# Patient Record
Sex: MALE | Race: WHITE | NOT HISPANIC OR LATINO
[De-identification: names, ages, dates, MRNs, and addresses within clinical notes are randomized per-mention and may not be internally consistent; named-entity substitution may affect disease eponyms.]

---

## 2021-10-12 PROBLEM — Z00.00 ENCOUNTER FOR PREVENTIVE HEALTH EXAMINATION: Status: ACTIVE | Noted: 2021-10-12

## 2021-10-21 ENCOUNTER — APPOINTMENT (OUTPATIENT)
Dept: INFECTIOUS DISEASE | Facility: CLINIC | Age: 61
End: 2021-10-21
Payer: COMMERCIAL

## 2021-10-21 ENCOUNTER — LABORATORY RESULT (OUTPATIENT)
Age: 61
End: 2021-10-21

## 2021-10-21 ENCOUNTER — NON-APPOINTMENT (OUTPATIENT)
Age: 61
End: 2021-10-21

## 2021-10-21 VITALS
OXYGEN SATURATION: 98 % | SYSTOLIC BLOOD PRESSURE: 124 MMHG | TEMPERATURE: 98.5 F | DIASTOLIC BLOOD PRESSURE: 82 MMHG | BODY MASS INDEX: 25.58 KG/M2 | HEART RATE: 86 BPM | WEIGHT: 163 LBS | HEIGHT: 67 IN | RESPIRATION RATE: 15 BRPM

## 2021-10-21 DIAGNOSIS — E11.37X2 TYPE 2 DIABETES MELLITUS WITH DIABETIC MACULAR EDEMA, RESOLVED FOLLOWING TREATMENT, LEFT EYE: ICD-10-CM

## 2021-10-21 DIAGNOSIS — N20.0 CALCULUS OF KIDNEY: ICD-10-CM

## 2021-10-21 PROCEDURE — 99204 OFFICE O/P NEW MOD 45 MIN: CPT

## 2021-10-21 NOTE — REASON FOR VISIT
[Consultation] : a consultation visit [FreeTextEntry1] : New pt referred by PCP Dr. Hyman for "Recurrent Shingles"\par currently c/o pain, itchiness, burning, and sensitivity on right side (abdomen/back)\par completed 7 day course of Valtrex 2 weeks ago, per pt has not resolved his issue; would like to discuss Shingles vaccine and other vaccines

## 2021-10-21 NOTE — HISTORY OF PRESENT ILLNESS
[FreeTextEntry1] : 61 Male with DM, HTN, and Nephrolithiasis\par S/P Fall at home around pool in ? January 2021 with mild back pain/ injury did not seek attention\par Pt developed acute painful tingling o left thorax and back in February and was Dx with Zoster sine lesions\par no skin lesions.  Pt had another bout of lesion less painful skin on left side in August\par Pt had High VZV IgG level and low VZV IgM  and was Dx with recurrent Zoster and prescribed Gabapentin\par Pt has no Immune disorder other than DM\par Hx Uric acid kidney stones S/P ,Lithotripsy\par \par Pt had Chicken pox as a child\par \par Pt is interested in receiving covid booster, Flu vaccine, and Shingrix\par \par Pt has had back exam including MRI studies which were reportedly normal

## 2021-10-21 NOTE — PHYSICAL EXAM
[General Appearance - Alert] : alert [General Appearance - In No Acute Distress] : in no acute distress [General Appearance - Well Nourished] : well nourished [General Appearance - Well-Appearing] : healthy appearing [Sclera] : the sclera and conjunctiva were normal [PERRL With Normal Accommodation] : pupils were equal in size, round, reactive to light [Extraocular Movements] : extraocular movements were intact [Outer Ear] : the ears and nose were normal in appearance [Hearing Threshold Finger Rub Not [place]] : hearing was normal [Examination Of The Oral Cavity] : the lips and gums were normal [Oropharynx] : the oropharynx was normal with no thrush [Neck Appearance] : the appearance of the neck was normal [Neck Cervical Mass (___cm)] : no neck mass was observed [Jugular Venous Distention Increased] : there was no jugular-venous distention [Thyroid Diffuse Enlargement] : the thyroid was not enlarged [Respiration, Rhythm And Depth] : normal respiratory rhythm and effort [Exaggerated Use Of Accessory Muscles For Inspiration] : no accessory muscle use [Auscultation Breath Sounds / Voice Sounds] : lungs were clear to auscultation bilaterally [Heart Rate And Rhythm] : heart rate was normal and rhythm regular [Heart Sounds] : normal S1 and S2 [Heart Sounds Gallop] : no gallops [Murmurs] : no murmurs [Heart Sounds Pericardial Friction Rub] : no pericardial rub [Full Pulse] : the pedal pulses are present [Edema] : there was no peripheral edema [Bowel Sounds] : normal bowel sounds [Abdomen Soft] : soft [Abdomen Tenderness] : non-tender [Costovertebral Angle Tenderness] : no CVA tenderness [Abdomen Mass (___ Cm)] : no abdominal mass palpated [No Palpable Adenopathy] : no palpable adenopathy [Musculoskeletal - Swelling] : no joint swelling [Range of Motion to Joints] : range of motion to joints [Nail Clubbing] : no clubbing  or cyanosis of the fingernails [Motor Tone] : muscle strength and tone were normal [Skin Color & Pigmentation] : normal skin color and pigmentation [] : no rash [Skin Lesions] : no skin lesions [Cranial Nerves] : cranial nerves 2-12 were intact [Sensation] : the sensory exam was normal to light touch and pinprick [Motor Exam] : the motor exam was normal [No Focal Deficits] : no focal deficits [Oriented To Time, Place, And Person] : oriented to person, place, and time [Affect] : the affect was normal 13yo  male, single, domiciled at [facility], in high school through the [employer] [employer], good student, PPH of depression, anxiety and autism/asperger's, no prior inpatient admissions or suicide attempts, multiple prior medication trials that patient cannot recall names of, no significant PMH, no history of substance abuse, NKDA, brought in by school, referred by outpatient psychiatrist, for worsening depression, suicidal ideation with plan and decline from baseline.    Collateral obtained from Dr. Monsivais prior to arrival. He reports patient has a history of autism/Asperger's disorder along with anxiety and depression. Patient had been doing fair at the program but not progressing enough with treatment to be downgraded to the day treatment program. He found out two weeks ago that he would have to remain at the [employer] [facility] [employer] for another year and became upset. A couple of days ago he found out that his roommate would be progressing to the day treatment program and he became very upset. He was isolating, not caring for his ADLs, not sleeping or getting out of bed. He reports patient is a star patient and that this is not his baseline. He also reports that patient is not dramatic and that he reported he wanted to hang himself, was tearful and shaking. He is concerned for patient and advocating for admission. Patient is a 15y/o  male, single, domiciled at [facility] where is a naomi in , good student, PPH of depression, anxiety and Autism/Asperger's, no prior inpatient admissions or suicide attempts, multiple prior medication trials that patient cannot recall names of, no significant PMH, no history of substance abuse, NKDA, brought in by school, referred by outpatient psychiatrist, for worsening depression, suicidal ideation with plan and decline from baseline.    Patient reports a rapid decline in mood 2 days ago when he had the realization that he does not know how to function in the real world, as he has been living in the residential system since age 5. He endorses being in usual state of health until that point. Earlier this week, patient was told he would not be leaving the residence for another year (see collateral) but he denies that this caused his suicidal thoughts. He endorses intrusive thoughts of various suicidal scenarios including hanging himself, an image of a gun to the head, and smashing his head on concrete. He denies developing intent or making preparations, but admits that yesterday he felt unsure if he could keep himself safe. He endorses feelings of helplessness and hopelessness when thinking about his ability to function without structure, "like a normal person." He does admit that perhaps being discharged from the residential program would have given him more time to adapt to the outside world. Current depressive symptoms include SI, hypersomnia, low energy, decreased self-care, and isolation. Patient states that he gets along with everyone at the residence but does not have any close friends. Enjoys watching movies and playing video games but states "I don't really do much." He feels well supported by his parents and spends weekend at home. Denies manic or psychotic symptoms past or present.     Collateral obtained from Dr. Monsivais prior to arrival. He reports patient has a history of autism/Asperger's disorder along with anxiety and depression. Patient had been doing fair at the program but not progressing enough with treatment to be downgraded to the day treatment program. He found out two weeks ago that he would have to remain at the residential for another year and became upset. A couple of days ago he found out that his roommate would be progressing to the day treatment program and he became very upset. He was isolating, not caring for his ADLs, not sleeping or getting out of bed. He reports patient is a star patient and that this is not his baseline. He also reports that patient is not dramatic and that he reported he wanted to hang himself, was tearful and shaking. He is concerned for patient and advocating for admission.

## 2021-10-21 NOTE — REVIEW OF SYSTEMS
[Fever] : no fever [Chills] : no chills [Body Aches] : no body aches [Difficulty Sleeping] : no difficulty sleeping [Feeling Sick] : not feeling sick [Feeling Tired] : not feeling tired [Normal Appetite] : appetite not normal  [Eye Pain] : no eye pain [Red Eyes] : eyes not red [Eyesight Problems] : no eyesight problems [Discharge From Eyes] : no purulent discharge from the eyes [Earache] : no earache [Loss Of Hearing] : no hearing loss [Nasal Discharge] : no nasal discharge [Sore Throat] : no sore throat [Hoarseness] : no hoarseness [Chest Pain] : no chest pain [Palpitations] : no palpitations [Leg Claudication] : no intermittent leg claudication [Lower Ext Edema] : no extremity edema [Shortness Of Breath] : no shortness of breath [Wheezing] : no wheezing [Cough] : no cough [SOB on Exertion] : no shortness of breath during exertion [Sputum] : not coughing up ~M sputum [Pleuritic Chest Pain] : no pleuritic chest pain [Abdominal Pain] : no abdominal pain [Vomiting] : no vomiting [Constipation] : no constipation [Diarrhea] : no diarrhea [Dysuria] : no dysuria [Joint Pain] : no joint pain [Joint Swelling] : no joint swelling [Joint Stiffness] : no joint stiffness [Limb Pain] : no limb pain [Limb Swelling] : no limb swelling [Skin Lesions] : no skin lesions [Skin Wound] : no skin wound [Itching] : no itching [Confused] : no confusion [Convulsions] : no convulsions [Dizziness] : no dizziness [Limb Weakness] : no limb weakness [Negative] : Heme/Lymph

## 2021-10-21 NOTE — ASSESSMENT
[FreeTextEntry1] : 61 male with HTN, DM, Nephrolithiasis, Uric Acid stones C/O 2 episodes of painful lateral chest wall irritation on different sides of his body  by 6 months\par I Doubt pt has had 2 episodes of Herpes Zoster sine eruptione ( No skin lesions)\par I suspect pt may have musculoskeletal pain from a prior injury where he slipped  on his pool deck in January - PRIOR to these episodes and hurt his back.  Pt reportedly had XR and MRI which were normal.\par VZV without lesions is extremely rare and recurrent contralateral disease is even rarer.\par High VZV IgG level is indicative of prior infection, likely chicken pox which pt had a s a child\par Doubt VZV IgM low positivity is accurate, NO PCR Performed.\par \par REC:  1.  Additional Lab studies\par           2.  Pt can receive Covid Booster, Flu vaccine , and Shingrix\par           3. check studies of immune function \par           4.  return 1 week [Treatment Education] : treatment education [Medical Care Issues] : medical care issues

## 2021-10-22 LAB
ALBUMIN SERPL ELPH-MCNC: 4.8 G/DL
ALP BLD-CCNC: 41 U/L
ALT SERPL-CCNC: 24 U/L
ANION GAP SERPL CALC-SCNC: 21 MMOL/L
APPEARANCE: ABNORMAL
AST SERPL-CCNC: 22 U/L
BASOPHILS # BLD AUTO: 0.03 K/UL
BASOPHILS NFR BLD AUTO: 0.5 %
BILIRUB SERPL-MCNC: 0.6 MG/DL
BILIRUBIN URINE: NEGATIVE
BLOOD URINE: NEGATIVE
BUN SERPL-MCNC: 18 MG/DL
CALCIUM SERPL-MCNC: 10.4 MG/DL
CHLORIDE SERPL-SCNC: 99 MMOL/L
CHOLEST SERPL-MCNC: 172 MG/DL
CK SERPL-CCNC: 89 U/L
CO2 SERPL-SCNC: 18 MMOL/L
COLOR: YELLOW
CREAT SERPL-MCNC: 1.04 MG/DL
CREAT SPEC-SCNC: 191 MG/DL
CRP SERPL-MCNC: <3 MG/L
EOSINOPHIL # BLD AUTO: 0.06 K/UL
EOSINOPHIL NFR BLD AUTO: 1 %
ERYTHROCYTE [SEDIMENTATION RATE] IN BLOOD BY WESTERGREN METHOD: 23 MM/HR
GLUCOSE QUALITATIVE U: NEGATIVE
GLUCOSE SERPL-MCNC: 114 MG/DL
HCT VFR BLD CALC: 42.4 %
HDLC SERPL-MCNC: 54 MG/DL
HGB BLD-MCNC: 14.3 G/DL
IMM GRANULOCYTES NFR BLD AUTO: 0.3 %
KETONES URINE: NEGATIVE
LDLC SERPL CALC-MCNC: 97 MG/DL
LEUKOCYTE ESTERASE URINE: NEGATIVE
LYMPHOCYTES # BLD AUTO: 1.29 K/UL
LYMPHOCYTES NFR BLD AUTO: 22.5 %
MAN DIFF?: NORMAL
MCHC RBC-ENTMCNC: 32.9 PG
MCHC RBC-ENTMCNC: 33.7 GM/DL
MCV RBC AUTO: 97.5 FL
MICROALBUMIN 24H UR DL<=1MG/L-MCNC: 1.2 MG/DL
MICROALBUMIN/CREAT 24H UR-RTO: 6 MG/G
MONOCYTES # BLD AUTO: 0.58 K/UL
MONOCYTES NFR BLD AUTO: 10.1 %
NEUTROPHILS # BLD AUTO: 3.75 K/UL
NEUTROPHILS NFR BLD AUTO: 65.6 %
NITRITE URINE: NEGATIVE
NONHDLC SERPL-MCNC: 118 MG/DL
PH URINE: 5
PLATELET # BLD AUTO: 303 K/UL
POTASSIUM SERPL-SCNC: 4.8 MMOL/L
PROT SERPL-MCNC: 7.5 G/DL
PROTEIN URINE: NORMAL
PSA SERPL-MCNC: 0.9 NG/ML
RBC # BLD: 4.35 M/UL
RBC # FLD: 13.2 %
SODIUM SERPL-SCNC: 139 MMOL/L
SPECIFIC GRAVITY URINE: 1.02
TRIGL SERPL-MCNC: 107 MG/DL
TSH SERPL-ACNC: 2.48 UIU/ML
URATE SERPL-MCNC: 6.4 MG/DL
UROBILINOGEN URINE: NORMAL
VZV AB TITR SER: POSITIVE
VZV IGG SER IF-ACNC: 661.3 INDEX
WBC # FLD AUTO: 5.73 K/UL

## 2021-10-24 LAB
B BURGDOR IGG+IGM SER QL IB: NORMAL
ESTIMATED AVERAGE GLUCOSE: 131 MG/DL
HBA1C MFR BLD HPLC: 6.2 %

## 2021-10-26 ENCOUNTER — TRANSCRIPTION ENCOUNTER (OUTPATIENT)
Age: 61
End: 2021-10-26

## 2021-10-27 LAB
ALBUMIN MFR SERPL ELPH: 60 %
ALBUMIN SERPL-MCNC: 4.5 G/DL
ALBUMIN/GLOB SERPL: 1.5 RATIO
ALPHA1 GLOB MFR SERPL ELPH: 3.7 %
ALPHA1 GLOB SERPL ELPH-MCNC: 0.3 G/DL
ALPHA2 GLOB MFR SERPL ELPH: 8.7 %
ALPHA2 GLOB SERPL ELPH-MCNC: 0.7 G/DL
B-GLOBULIN MFR SERPL ELPH: 12.4 %
B-GLOBULIN SERPL ELPH-MCNC: 0.9 G/DL
DEPRECATED KAPPA LC FREE/LAMBDA SER: 0.96 RATIO
GAMMA GLOB FLD ELPH-MCNC: 1.1 G/DL
GAMMA GLOB MFR SERPL ELPH: 15.2 %
IGA SER QL IEP: 294 MG/DL
IGG SER QL IEP: 1159 MG/DL
IGG SUBSET TOTAL IGG: 1118 MG/DL
IGG1 SER-MCNC: 720 MG/DL
IGG2 SER-MCNC: 316 MG/DL
IGG3 SER-MCNC: 29 MG/DL
IGG4 SER-MCNC: 18 MG/DL
IGM SER QL IEP: 140 MG/DL
INTERPRETATION SERPL IEP-IMP: NORMAL
KAPPA LC CSF-MCNC: 1.4 MG/DL
KAPPA LC SERPL-MCNC: 1.35 MG/DL
M PROTEIN SPEC IFE-MCNC: NORMAL
PROT SERPL-MCNC: 7.5 G/DL
PROT SERPL-MCNC: 7.5 G/DL

## 2021-10-28 ENCOUNTER — APPOINTMENT (OUTPATIENT)
Dept: INFECTIOUS DISEASE | Facility: CLINIC | Age: 61
End: 2021-10-28
Payer: COMMERCIAL

## 2021-10-28 VITALS
OXYGEN SATURATION: 98 % | HEART RATE: 68 BPM | SYSTOLIC BLOOD PRESSURE: 108 MMHG | TEMPERATURE: 98.4 F | DIASTOLIC BLOOD PRESSURE: 80 MMHG | RESPIRATION RATE: 15 BRPM

## 2021-10-28 DIAGNOSIS — G62.9 POLYNEUROPATHY, UNSPECIFIED: ICD-10-CM

## 2021-10-28 DIAGNOSIS — E79.0 HYPERURICEMIA W/OUT SIGNS OF INFLAMMATORY ARTHRITIS AND TOPHACEOUS DISEASE: ICD-10-CM

## 2021-10-28 DIAGNOSIS — E11.9 TYPE 2 DIABETES MELLITUS W/OUT COMPLICATIONS: ICD-10-CM

## 2021-10-28 PROCEDURE — 99213 OFFICE O/P EST LOW 20 MIN: CPT

## 2021-10-28 NOTE — PHYSICAL EXAM
[General Appearance - Alert] : alert [General Appearance - In No Acute Distress] : in no acute distress [General Appearance - Well Nourished] : well nourished [General Appearance - Well-Appearing] : healthy appearing [Sclera] : the sclera and conjunctiva were normal [PERRL With Normal Accommodation] : pupils were equal in size, round, reactive to light [Extraocular Movements] : extraocular movements were intact [Outer Ear] : the ears and nose were normal in appearance [Hearing Threshold Finger Rub Not [place]] : hearing was normal [Examination Of The Oral Cavity] : the lips and gums were normal [Oropharynx] : the oropharynx was normal with no thrush [Neck Appearance] : the appearance of the neck was normal [Neck Cervical Mass (___cm)] : no neck mass was observed [Jugular Venous Distention Increased] : there was no jugular-venous distention [Thyroid Diffuse Enlargement] : the thyroid was not enlarged [Respiration, Rhythm And Depth] : normal respiratory rhythm and effort [Exaggerated Use Of Accessory Muscles For Inspiration] : no accessory muscle use [Auscultation Breath Sounds / Voice Sounds] : lungs were clear to auscultation bilaterally [Heart Rate And Rhythm] : heart rate was normal and rhythm regular [Heart Sounds] : normal S1 and S2 [Heart Sounds Gallop] : no gallops [Murmurs] : no murmurs [Heart Sounds Pericardial Friction Rub] : no pericardial rub [Full Pulse] : the pedal pulses are present [Edema] : there was no peripheral edema [Bowel Sounds] : normal bowel sounds [Abdomen Soft] : soft [Abdomen Tenderness] : non-tender [Abdomen Mass (___ Cm)] : no abdominal mass palpated [Costovertebral Angle Tenderness] : no CVA tenderness [No Palpable Adenopathy] : no palpable adenopathy [Musculoskeletal - Swelling] : no joint swelling [Range of Motion to Joints] : range of motion to joints [Nail Clubbing] : no clubbing  or cyanosis of the fingernails [Motor Tone] : muscle strength and tone were normal [Skin Color & Pigmentation] : normal skin color and pigmentation [] : no rash [Skin Lesions] : no skin lesions [Cranial Nerves] : cranial nerves 2-12 were intact [Sensation] : the sensory exam was normal to light touch and pinprick [Motor Exam] : the motor exam was normal [No Focal Deficits] : no focal deficits [Oriented To Time, Place, And Person] : oriented to person, place, and time [Affect] : the affect was normal

## 2021-10-28 NOTE — HISTORY OF PRESENT ILLNESS
[FreeTextEntry1] : 61 Male with DM, HTN, and Nephrolithiasis\par S/P Fall at home around pool in ? January 2021 with mild back pain/ injury did not seek attention\par Pt developed acute painful tingling o left thorax and back in February and was Dx with Zoster sine lesions\par no skin lesions.  Pt had another bout of lesion less painful skin on left side in August\par Pt had High VZV IgG level and low VZV IgM  and was Dx with recurrent Zoster and prescribed Gabapentin\par Pt has no Immune disorder other than DM\par Hx Uric acid kidney stones S/P ,Lithotripsy\par \par Pt had Chicken pox as a child\par \par Pt is interested in receiving covid booster, Flu vaccine, and Shingrix\par \par Pt has had back exam including MRI studies which were reportedly normal\par \par 10/28/21\par Pt saw a neurologist who believes back pain is neurological.  Repeat MRI Ordered, 5 day course of prednisone prescribed, and dose of Neurontin increased to 900 mg PO Q8h\par \par Reviewed all of patient's labs\par Pt is immune to varicella, High IgG Titer\par \par Informed patient that he is eligible to Receive Flu vaccine, Booster dose of covid vaccine, and to start   2 dose Shingrix series.\par \par Full physical not performed\par \par Patient will Follow with neurology and try to reduce dose of Neurontin\par Pt advised after finishing Prednisone to obtain indicated vaccines\par \par All Questions answered\par

## 2021-10-28 NOTE — REVIEW OF SYSTEMS
[Fever] : no fever [Chills] : no chills [Body Aches] : no body aches [Difficulty Sleeping] : no difficulty sleeping [Feeling Sick] : not feeling sick [Feeling Tired] : not feeling tired [Normal Appetite] : appetite not normal  [Eye Pain] : no eye pain [Red Eyes] : eyes not red [Eyesight Problems] : no eyesight problems [Discharge From Eyes] : no purulent discharge from the eyes [Earache] : no earache [Nasal Discharge] : no nasal discharge [Sore Throat] : no sore throat [Hoarseness] : no hoarseness [Chest Pain] : no chest pain [Palpitations] : no palpitations [Leg Claudication] : no intermittent leg claudication [Shortness Of Breath] : no shortness of breath [Wheezing] : no wheezing [Cough] : no cough [SOB on Exertion] : no shortness of breath during exertion [Sputum] : not coughing up ~M sputum [Pleuritic Chest Pain] : no pleuritic chest pain [Abdominal Pain] : no abdominal pain [Vomiting] : no vomiting [Constipation] : no constipation [Diarrhea] : no diarrhea [Joint Pain] : no joint pain [Negative] : Heme/Lymph [de-identified] : decreased back pain

## 2021-10-28 NOTE — ASSESSMENT
[FreeTextEntry1] : 61 Male with back injury following fall  on pool deck in Jan 2021\par C/O 2 episodes of back pain occurring on different sides of trunk with NO skin lesions\par A neurologist and I both attribute back pain to prior back injury and not to lesion-less shingles occurring on 2 different occasions on opposite sides of his body.\par \par Full lab evaluation normal\par SPEP Negative, IgG subclasses normal , Hgb A1C  6.2\par Positive IgG VZV AB,  ESR 23, , CRP  < 3 ,  UA 6,  Lyme Negative, TSH Normal\par \par REC:  1.  Patient should continue with Neurology work up\par           2.  Decrease dose of Neurontin if possible\par           3.  After steroid therapy, patient may proceed with Flu, Covid Booster, and Shingrix vaccines\par Answered all questions\par \par No further ID Office F/U\par Pt may reschedule should need arise\par  [Treatment Education] : treatment education [Medical Care Issues] : medical care issues

## 2021-10-28 NOTE — REASON FOR VISIT
[Follow-Up: _____] : a [unfilled] follow-up visit [FreeTextEntry1] : 1 wk fu, discuss bw results \par pt saw Neuro (Marlin Cunningham) 2 days ago, was told he has inflamed nerve and was put on Prednisone (has not started), \par Neuro increased Gabapentin to 900mg daily, feels slight resolve in symptoms

## 2022-02-04 ENCOUNTER — APPOINTMENT (OUTPATIENT)
Dept: UROLOGY | Facility: CLINIC | Age: 62
End: 2022-02-04
Payer: COMMERCIAL

## 2022-02-04 VITALS
HEIGHT: 68 IN | OXYGEN SATURATION: 98 % | SYSTOLIC BLOOD PRESSURE: 138 MMHG | BODY MASS INDEX: 24.71 KG/M2 | WEIGHT: 163 LBS | HEART RATE: 70 BPM | DIASTOLIC BLOOD PRESSURE: 79 MMHG

## 2022-02-04 DIAGNOSIS — Z78.9 OTHER SPECIFIED HEALTH STATUS: ICD-10-CM

## 2022-02-04 DIAGNOSIS — Z72.89 OTHER PROBLEMS RELATED TO LIFESTYLE: ICD-10-CM

## 2022-02-04 PROCEDURE — 99204 OFFICE O/P NEW MOD 45 MIN: CPT

## 2022-02-04 NOTE — REVIEW OF SYSTEMS
[Negative] : Heme/Lymph [Told you have blood in urine on a urine test] : told blood was present in a urine test [History of kidney stones] : history of kidney stones [Wake up at night to urinate  How many times?  ___] : wakes up to urinate [unfilled] times during the night [Slow urine stream] : slow urine stream

## 2022-02-04 NOTE — HISTORY OF PRESENT ILLNESS
[FreeTextEntry1] : 61 year old male presents for kidney stone. \par Has bilateral kidney stones and was to undergo ESWL with Dr Gusman at [facility] and cannot follow with him due to Insurance issues. \par Has history of kidney stones, passed a few, has required ESWL and Ureteroscopy in the past. \par Reports slow and steady stream, urinates 4-5 x or so during the day. Nocturia of 1-2 x. \par Denies hesitancy, straining, intermittency, urgency, incontinence, sense of incomplete emptying.\par Denies dysuria, hematuria, lower abdominal or flank pain, fever, chills or rigors.\par Normal libido and erections. \par No family history of Prostate cancer. \par \par Has tried Flomax in the past, had dizziness. \par \par

## 2022-02-04 NOTE — PHYSICAL EXAM
[Normal Appearance] : normal appearance [General Appearance - In No Acute Distress] : no acute distress [] : no respiratory distress [Abdomen Soft] : soft [Abdomen Tenderness] : non-tender [Penis Abnormality] : normal circumcised penis [Scrotum] : the scrotum was normal [Epididymis] : the epididymides were normal [Testes Tenderness] : no tenderness of the testes [Testes Mass (___cm)] : there were no testicular masses [Normal Station and Gait] : the gait and station were normal for the patient's age [Skin Color & Pigmentation] : normal skin color and pigmentation [No Focal Deficits] : no focal deficits [Oriented To Time, Place, And Person] : oriented to person, place, and time [FreeTextEntry1] : Meatal stenosis, SHERON deferred, Patient has active anal fissure

## 2022-02-04 NOTE — ASSESSMENT
[FreeTextEntry1] : Reviewed outside records. \par On CT scan(Aug 2021): 6 mm bilateral non obstructing kidney stones. \par \par Kidney stone:\par Discussed the management options for non obstructing kidney stones- watch and wait Vs Ureteroscopy Vs Shock wave lithotripsy- if radio-opaque or ultrasound amenable. \par Risks and benefits of each modality were discussed. \par Patient will consider options. \par Will get Renal Ultrasound and KUB. \par Will inform results. 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 \par Recommended Kidney stone prevention diet:\par Good oral hydration so that urine is clear to light yellow, usually 1.5 to 2 Liters of fluids, mainly water\par Increasing Citrate in diet by consuming citrus fruits and juices- bob, limes, oranges, grapefruits and berries \par Less red meat\par Less salt\par Limit foods with oxalate like- dark green vegetables, rhubarb, chocolate, wheat bran, nuts, cranberries, and beans\par \par Will get Kidney stone metabolic work up- Ca, PTH, Uric acid and 24 Hr urine kidney stone risk profile before follow up appointment. \par \par Benign Prostatic Hyperplasia:\par Will get Urinalysis and Urine culture. \par Discussed treatment options. Recommended Flomax at bed time. Patient agreeable. \par Prescribed Flomax. Explained common side effects: nasal congestion, cough, muscle pain, back pain, dizziness, orthostatic hypotension, somnolence and retrograde ejaculation. \par \par PSA Screening:\par Discussed PSA screening and latest recommendations/guidelines- USPTF and AUA. \par Will get PSA.\par \par Return to office in 3 months or sooner if any issues: will do Uroflo/PVR. \par

## 2022-02-04 NOTE — LETTER BODY
[Dear  ___] : Dear  [unfilled], [Consult Letter:] : I had the pleasure of evaluating your patient, [unfilled]. [( Thank you for referring [unfilled] for consultation for _____ )] : Thank you for referring [unfilled] for consultation for [unfilled] [Please see my note below.] : Please see my note below. [Consult Closing:] : Thank you very much for allowing me to participate in the care of this patient.  If you have any questions, please do not hesitate to contact me. [Sincerely,] : Sincerely, [FreeTextEntry3] : Rahat Layton MD\par  of Urology\par [facility]\par \par Offices:\par [facility] @\par [address], [place] [address]\par and\par [address], [place] [address], [address]\par and\par [address] [address]\par \par TEL: [phone]\par FAX: [phone]

## 2022-02-09 ENCOUNTER — APPOINTMENT (OUTPATIENT)
Dept: RADIOLOGY | Facility: CLINIC | Age: 62
End: 2022-02-09
Payer: COMMERCIAL

## 2022-02-09 ENCOUNTER — APPOINTMENT (OUTPATIENT)
Dept: ULTRASOUND IMAGING | Facility: CLINIC | Age: 62
End: 2022-02-09
Payer: COMMERCIAL

## 2022-02-09 ENCOUNTER — OUTPATIENT (OUTPATIENT)
Dept: OUTPATIENT SERVICES | Facility: HOSPITAL | Age: 62
LOS: 1 days | End: 2022-02-09
Payer: COMMERCIAL

## 2022-02-09 DIAGNOSIS — N20.0 CALCULUS OF KIDNEY: ICD-10-CM

## 2022-02-09 PROCEDURE — 74018 RADEX ABDOMEN 1 VIEW: CPT | Mod: 26

## 2022-02-09 PROCEDURE — 76770 US EXAM ABDO BACK WALL COMP: CPT

## 2022-02-09 PROCEDURE — 76770 US EXAM ABDO BACK WALL COMP: CPT | Mod: 26

## 2022-02-09 PROCEDURE — 74018 RADEX ABDOMEN 1 VIEW: CPT

## 2022-02-11 ENCOUNTER — TRANSCRIPTION ENCOUNTER (OUTPATIENT)
Age: 62
End: 2022-02-11

## 2022-02-11 LAB
APPEARANCE: CLEAR
BACTERIA UR CULT: NORMAL
BACTERIA: NEGATIVE
BILIRUBIN URINE: NEGATIVE
BLOOD URINE: NEGATIVE
CALCIUM OXALATE CRYSTALS: ABNORMAL
COLOR: NORMAL
GLUCOSE QUALITATIVE U: NEGATIVE
HYALINE CASTS: 1 /LPF
KETONES URINE: NEGATIVE
LEUKOCYTE ESTERASE URINE: NEGATIVE
MICROSCOPIC-UA: NORMAL
NITRITE URINE: NEGATIVE
PH URINE: 5.5
PROTEIN URINE: NORMAL
RED BLOOD CELLS URINE: 1 /HPF
SPECIFIC GRAVITY URINE: 1.03
SQUAMOUS EPITHELIAL CELLS: 0 /HPF
URIC ACID CRYSTALS: ABNORMAL
UROBILINOGEN URINE: NORMAL
WHITE BLOOD CELLS URINE: 3 /HPF

## 2022-05-10 ENCOUNTER — APPOINTMENT (OUTPATIENT)
Dept: UROLOGY | Facility: CLINIC | Age: 62
End: 2022-05-10
Payer: COMMERCIAL

## 2022-05-10 PROCEDURE — 51741 ELECTRO-UROFLOWMETRY FIRST: CPT

## 2022-05-10 PROCEDURE — 99214 OFFICE O/P EST MOD 30 MIN: CPT | Mod: 25

## 2022-05-10 PROCEDURE — 51798 US URINE CAPACITY MEASURE: CPT

## 2022-05-10 NOTE — ASSESSMENT
[FreeTextEntry1] : Reviewed records. \par PSA: 0.8(5/4/2022). \par \par Kidney stone:\par Bilateral non obstructing kidney stone. On;y right lower pole kidney stone seen on X ray of Kidney, Ureter and Bladder. Discussed treatment options. \par Will observe for now. \par \par PSA Screening:\par PSA within normal limits. Continue PSA Screening. \par \par Benign Prostatic Hyperplasia:\par See Uroflo and PVR. \par Discussed treatment options mainly: continued medical management with alpha blocker and or 5 alpha reductase inhibitor Vs Clean intermittent catheterization/Indwelling Rivers catheter Vs Surgery: Transurethral resection/vaporization/ablation of Prostate and Simple prostatectomy: open Vs robotic after appropriate work up.\par Also discussed emerging minimally invasive surgical therapies: Prostatic urethral lift (Urolift) and Water vapor thermal therapy (Rezum). \par Discussed risks and benefits of each. \par Will change Flomax to Rapaflo. Discussed similar side effect profile as Flomax with slightly increased incidence of retrograde ejaculation. \par \par Return to office in 3 months or sooner if any issues: will do Uroflo/PVR.

## 2022-05-10 NOTE — PHYSICAL EXAM
[Normal Appearance] : normal appearance [General Appearance - In No Acute Distress] : no acute distress [Abdomen Soft] : soft [Abdomen Tenderness] : non-tender [] : no respiratory distress [Oriented To Time, Place, And Person] : oriented to person, place, and time [Normal Station and Gait] : the gait and station were normal for the patient's age

## 2022-05-10 NOTE — HISTORY OF PRESENT ILLNESS
[FreeTextEntry1] : 62 year old male presents for follow up \par Tried Flomax for 4 weeks, flow was better however had tiredness, headache and dizziness, so stopped. \par Mentions with Alfuzosin in the past had low BP. \par Had Renal Bladder Ultrasound and X ray of Kidney, Ureter and Bladder. \par \par Initially seen for kidney stone. \par Had bilateral kidney stones and was to undergo ESWL with Dr Gusman at [facility] and cannot follow with him due to Insurance issues. \par Has history of kidney stones, passed a few, has required ESWL and Ureteroscopy in the past. \par Reported slow and steady stream, urinates 4-5 x or so during the day. Nocturia of 1-2 x. \par Denied hesitancy, straining, intermittency, urgency, incontinence, sense of incomplete emptying.\par Denied dysuria, hematuria, lower abdominal or flank pain, fever, chills or rigors.\par Normal libido and erections. \par No family history of Prostate cancer. \par \par Has tried Flomax in the past, had dizziness. \par \par

## 2022-08-30 ENCOUNTER — APPOINTMENT (OUTPATIENT)
Dept: UROLOGY | Facility: CLINIC | Age: 62
End: 2022-08-30

## 2022-08-30 PROCEDURE — 51798 US URINE CAPACITY MEASURE: CPT

## 2022-08-30 PROCEDURE — 99214 OFFICE O/P EST MOD 30 MIN: CPT | Mod: 25

## 2022-08-30 NOTE — LETTER BODY
[Dear  ___] : Dear  [unfilled], [Courtesy Letter:] : I had the pleasure of seeing your patient, [unfilled], in my office today. [Please see my note below.] : Please see my note below. [Sincerely,] : Sincerely, [FreeTextEntry3] : Rahat Layton MD\par  of Urology\par [employer]\par \par [facility]\par Offices:\par [address] [facility], [facility]\par [address] [address]\par [address] [facility], [address]\par \par TEL: [phone]\par FAX: [phone]

## 2022-08-30 NOTE — ASSESSMENT
[FreeTextEntry1] : Benign Prostatic Hyperplasia: \par Discussed treatment options mainly: continued medical management with alpha blocker and or 5 alpha reductase inhibitor Vs Clean intermittent catheterization/Indwelling Rivers catheter Vs Surgery: Transurethral resection/vaporization/ablation of Prostate and Simple prostatectomy: open Vs robotic after appropriate work up.\par Also discussed emerging minimally invasive surgical therapies: Prostatic urethral lift (Urolift) and Water vapor thermal therapy (Rezum). \par Discussed risks and benefits of each. \par Patient will consider his options. \par \par Kidney stone:\par Gross hematuria:\par Will get Urinalysis, Urine culture and Urine cytology. \par Will get CT Urogram.\par \par Return to office after CT scan.

## 2022-08-31 LAB
APPEARANCE: CLEAR
BACTERIA: NEGATIVE
BILIRUBIN URINE: NEGATIVE
BLOOD URINE: NEGATIVE
COLOR: COLORLESS
GLUCOSE QUALITATIVE U: NEGATIVE
HYALINE CASTS: 0 /LPF
KETONES URINE: NEGATIVE
LEUKOCYTE ESTERASE URINE: NEGATIVE
MICROSCOPIC-UA: NORMAL
NITRITE URINE: NEGATIVE
PH URINE: 6
PROTEIN URINE: NEGATIVE
RED BLOOD CELLS URINE: 0 /HPF
SPECIFIC GRAVITY URINE: 1.01
SQUAMOUS EPITHELIAL CELLS: 0 /HPF
URINE CYTOLOGY: NORMAL
UROBILINOGEN URINE: NORMAL
WHITE BLOOD CELLS URINE: 1 /HPF

## 2022-09-01 LAB — BACTERIA UR CULT: NORMAL

## 2022-09-23 ENCOUNTER — NON-APPOINTMENT (OUTPATIENT)
Age: 62
End: 2022-09-23

## 2022-11-03 ENCOUNTER — NON-APPOINTMENT (OUTPATIENT)
Age: 62
End: 2022-11-03

## 2022-11-23 ENCOUNTER — RX RENEWAL (OUTPATIENT)
Age: 62
End: 2022-11-23

## 2022-11-28 ENCOUNTER — OFFICE (OUTPATIENT)
Dept: URBAN - METROPOLITAN AREA CLINIC 88 | Facility: CLINIC | Age: 62
Setting detail: OPHTHALMOLOGY
End: 2022-11-28
Payer: COMMERCIAL

## 2022-11-28 DIAGNOSIS — H35.372: ICD-10-CM

## 2022-11-28 DIAGNOSIS — E11.9: ICD-10-CM

## 2022-11-28 DIAGNOSIS — H43.813: ICD-10-CM

## 2022-11-28 DIAGNOSIS — H35.033: ICD-10-CM

## 2022-11-28 DIAGNOSIS — H31.002: ICD-10-CM

## 2022-11-28 PROCEDURE — 92250 FUNDUS PHOTOGRAPHY W/I&R: CPT | Performed by: OPHTHALMOLOGY

## 2022-11-28 PROCEDURE — 92014 COMPRE OPH EXAM EST PT 1/>: CPT | Performed by: OPHTHALMOLOGY

## 2022-11-28 ASSESSMENT — AXIALLENGTH_DERIVED
OS_AL: 23.3564
OD_AL: 23.8735

## 2022-11-28 ASSESSMENT — REFRACTION_AUTOREFRACTION
OD_SPHERE: +1.50
OD_AXIS: 093
OD_CYLINDER: -3.25
OS_AXIS: 014
OS_SPHERE: +0.75
OS_CYLINDER: -0.75

## 2022-11-28 ASSESSMENT — TONOMETRY
OD_IOP_MMHG: 15
OS_IOP_MMHG: 17

## 2022-11-28 ASSESSMENT — REFRACTION_CURRENTRX
OS_SPHERE: +0.50
OD_SPHERE: +1.25
OD_OVR_VA: 20/
OS_AXIS: 173
OS_VPRISM_DIRECTION: SV
OS_CYLINDER: -0.75
OD_CYLINDER: -3.00
OS_OVR_VA: 20/
OD_VPRISM_DIRECTION: SV
OD_AXIS: 84

## 2022-11-28 ASSESSMENT — KERATOMETRY
OD_K1POWER_DIOPTERS: 41.75
OS_AXISANGLE_DEGREES: 098
OS_K2POWER_DIOPTERS: 44.50
OS_K1POWER_DIOPTERS: 43.00
OD_K2POWER_DIOPTERS: 44.00
OD_AXISANGLE_DEGREES: 004

## 2022-11-28 ASSESSMENT — SPHEQUIV_DERIVED
OS_SPHEQUIV: 0.375
OD_SPHEQUIV: -0.125

## 2022-11-28 ASSESSMENT — VISUAL ACUITY
OS_BCVA: 20/25-1
OD_BCVA: 20/80-2

## 2022-11-28 ASSESSMENT — CONFRONTATIONAL VISUAL FIELD TEST (CVF)
OS_FINDINGS: FULL
OD_FINDINGS: FULL

## 2022-12-20 ENCOUNTER — APPOINTMENT (OUTPATIENT)
Dept: UROLOGY | Facility: CLINIC | Age: 62
End: 2022-12-20

## 2022-12-20 VITALS
WEIGHT: 163 LBS | HEIGHT: 68 IN | TEMPERATURE: 97.3 F | DIASTOLIC BLOOD PRESSURE: 82 MMHG | SYSTOLIC BLOOD PRESSURE: 141 MMHG | BODY MASS INDEX: 24.71 KG/M2

## 2022-12-20 DIAGNOSIS — R31.0 GROSS HEMATURIA: ICD-10-CM

## 2022-12-20 PROCEDURE — 99214 OFFICE O/P EST MOD 30 MIN: CPT

## 2022-12-20 RX ORDER — SILODOSIN 4 MG/1
4 CAPSULE ORAL
Qty: 30 | Refills: 1 | Status: DISCONTINUED | COMMUNITY
Start: 2022-05-10 | End: 2022-12-20

## 2022-12-27 PROBLEM — R31.0 GROSS HEMATURIA: Status: ACTIVE | Noted: 2022-08-30

## 2022-12-27 NOTE — HISTORY OF PRESENT ILLNESS
[FreeTextEntry1] : 62 year old male presents for follow up. \par No new episode of gross hematuria, had CT scan. \par With Silodosin had painful ejaculation and retrograde ejaculation. \par Taking Flomax, has reasonable stream, daytime frequency 4 to 5 x. Nocturia 1 x. \par Has on and off left back pain, 2 to 3/10.\par Denies dysuria, hematuria, nausea, vomiting, fever, chills or rigors. \par \par Seen on 8/30/22. Passed a small stone.\par Still had off and on blood tinged urine. Also had off and on back pain. \par Tried Silodosin 4 mg for 4 days had retrograde ejaculation, stopped. \par With Flomax had headache and increased heart rate. \par Also had reported tiredness and dizziness. \par With Alfuzosin in the past had low BP. \par \par Initially seen for kidney stone. \par Had bilateral kidney stones and was to undergo ESWL with Dr Gusman at [facility] and cannot follow with him due to Insurance issues. \par Has history of kidney stones, passed a few, has required ESWL and Ureteroscopy in the past. \par Reported slow and steady stream, urinates 4-5 x or so during the day. Nocturia of 1-2 x. \par Denied hesitancy, straining, intermittency, urgency, incontinence, sense of incomplete emptying.\par Denied dysuria, hematuria, lower abdominal or flank pain, fever, chills or rigors.\par Normal libido and erections. \par No family history of Prostate cancer. \par \par Has tried Flomax in the past, had dizziness. \par \par

## 2022-12-27 NOTE — LETTER BODY
[Dear  ___] : Dear  [unfilled], [Courtesy Letter:] : I had the pleasure of seeing your patient, [unfilled], in my office today. [Please see my note below.] : Please see my note below. [Sincerely,] : Sincerely, [FreeTextEntry3] : Rahat Layton MD\par  of Urology\par [facility] [employer]\par \par [facility]\par Offices:\par [address] [facility], [facility]\par [address] [address]\par [address] [facility], [address]\par \par TEL: [phone]\par FAX: [phone]

## 2022-12-27 NOTE — PHYSICAL EXAM
[Normal Appearance] : normal appearance [General Appearance - In No Acute Distress] : no acute distress [] : no respiratory distress [Oriented To Time, Place, And Person] : oriented to person, place, and time [Normal Station and Gait] : the gait and station were normal for the patient's age [FreeTextEntry1] : normal peripheral circulation

## 2022-12-27 NOTE — ASSESSMENT
[FreeTextEntry1] : Reviewed records.\par Discussed labs and imaging. \par CT scan Abdomen and Pelvis(11/16/22):\par 1.5 cm and 1 cm left renal pelvic kidney stones. \par Additional multiple non obstructing right kidney stones: 0.1 to 0.4 cm. . \par \par Gross hematuria:\par Discussed work up of hematuria so far. Recommended Cystoscopy. Discussed risks and benefits. \par Patient hesitant to undergo under local anesthesia. \par \par Benign Prostatic Hyperplasia: \par Discussed treatment options, will continue Flomax. \par \par Kidney stone:\par Discussed the options of Medical management Vs Ureteroscopy Vs Shock wave lithotripsy Vs Percutaneous nephrostolithotomy. \par Risks and benefits of each modality were discussed.\par Patient will like to have ureteroscopy. Understands considering stone burden may need staged procedure. \par Discussed the procedure: Ureteroscopy. Risks associated including but not limited to bleeding, urinary tract infection, urinary sepsis, failure of the procedure. \par Discussed risk of injury to the ureter including perforation and or avulsion. Discussed risk of injury to other surrounding structures, ureteral stricture formation, urinoma, urinary fistula, failure to remove all stone fragments and need for additional procedures: ESWL, repeat ureteroscopy, percutaneous or open surgery and or reconstructive surgery. \par Discussed need for ureteral stent, pain and discomfort associated with it, possible risk of proximal or distal migration of it and need for subsequent Cystoscopy to remove the ureteral stent.\par Will like ureteral stent removal also under general anesthesia. \par \par Discussed that Anesthesiologist will discuss the risks and complications associated with Anesthesia in more depth separately.\par \par Will need medical clearance with PCP and presurgical testing at [facility].\par Patient agreeable to proceed with Ureteroscopy. \par Will schedule for next available date [facility]. \par \par Call us or go to Emergency department for high grade fever, intractable nausea and vomiting, severe pain and or for worsening of condition. Discussed will need urgent ureteral stent or nephrostomy tube placement.

## 2023-01-06 ENCOUNTER — NON-APPOINTMENT (OUTPATIENT)
Age: 63
End: 2023-01-06

## 2023-01-09 ENCOUNTER — APPOINTMENT (OUTPATIENT)
Dept: RADIOLOGY | Facility: CLINIC | Age: 63
End: 2023-01-09
Payer: COMMERCIAL

## 2023-01-09 ENCOUNTER — OUTPATIENT (OUTPATIENT)
Dept: OUTPATIENT SERVICES | Facility: HOSPITAL | Age: 63
LOS: 1 days | End: 2023-01-09
Payer: COMMERCIAL

## 2023-01-09 DIAGNOSIS — N20.0 CALCULUS OF KIDNEY: ICD-10-CM

## 2023-01-09 PROCEDURE — 71046 X-RAY EXAM CHEST 2 VIEWS: CPT

## 2023-01-09 PROCEDURE — 71046 X-RAY EXAM CHEST 2 VIEWS: CPT | Mod: 26

## 2023-01-10 ENCOUNTER — APPOINTMENT (OUTPATIENT)
Dept: RADIOLOGY | Facility: CLINIC | Age: 63
End: 2023-01-10

## 2023-01-10 LAB
ANION GAP SERPL CALC-SCNC: 14 MMOL/L
APPEARANCE: CLEAR
APTT BLD: 32.6 SEC
BACTERIA: NEGATIVE
BASOPHILS # BLD AUTO: 0.04 K/UL
BASOPHILS NFR BLD AUTO: 0.5 %
BILIRUBIN URINE: NEGATIVE
BLOOD URINE: NEGATIVE
BUN SERPL-MCNC: 27 MG/DL
CALCIUM SERPL-MCNC: 10 MG/DL
CHLORIDE SERPL-SCNC: 98 MMOL/L
CO2 SERPL-SCNC: 24 MMOL/L
COLOR: NORMAL
CREAT SERPL-MCNC: 1.82 MG/DL
EGFR: 41 ML/MIN/1.73M2
EOSINOPHIL # BLD AUTO: 0.18 K/UL
EOSINOPHIL NFR BLD AUTO: 2.3 %
GLUCOSE QUALITATIVE U: ABNORMAL
GLUCOSE SERPL-MCNC: 153 MG/DL
HCT VFR BLD CALC: 44.1 %
HGB BLD-MCNC: 14.2 G/DL
HYALINE CASTS: 0 /LPF
IMM GRANULOCYTES NFR BLD AUTO: 0.5 %
INR PPP: 1.01 RATIO
KETONES URINE: NEGATIVE
LEUKOCYTE ESTERASE URINE: NEGATIVE
LYMPHOCYTES # BLD AUTO: 1.32 K/UL
LYMPHOCYTES NFR BLD AUTO: 16.5 %
MAN DIFF?: NORMAL
MCHC RBC-ENTMCNC: 31.1 PG
MCHC RBC-ENTMCNC: 32.2 GM/DL
MCV RBC AUTO: 96.7 FL
MICROSCOPIC-UA: NORMAL
MONOCYTES # BLD AUTO: 0.85 K/UL
MONOCYTES NFR BLD AUTO: 10.6 %
NEUTROPHILS # BLD AUTO: 5.57 K/UL
NEUTROPHILS NFR BLD AUTO: 69.6 %
NITRITE URINE: NEGATIVE
PH URINE: 6
PLATELET # BLD AUTO: 299 K/UL
POTASSIUM SERPL-SCNC: 4.4 MMOL/L
PROTEIN URINE: NEGATIVE
PT BLD: 11.7 SEC
RBC # BLD: 4.56 M/UL
RBC # FLD: 12.3 %
RED BLOOD CELLS URINE: 1 /HPF
SARS-COV-2 N GENE NPH QL NAA+PROBE: NOT DETECTED
SODIUM SERPL-SCNC: 136 MMOL/L
SPECIFIC GRAVITY URINE: 1.01
SQUAMOUS EPITHELIAL CELLS: 0 /HPF
UROBILINOGEN URINE: NORMAL
WBC # FLD AUTO: 8 K/UL
WHITE BLOOD CELLS URINE: 0 /HPF

## 2023-01-11 LAB — BACTERIA UR CULT: NORMAL

## 2023-01-11 RX ORDER — SODIUM CHLORIDE 9 MG/ML
1000 INJECTION, SOLUTION INTRAVENOUS
Refills: 0 | Status: DISCONTINUED | OUTPATIENT
Start: 2023-01-12 | End: 2023-01-12

## 2023-01-11 RX ORDER — FENTANYL CITRATE 50 UG/ML
50 INJECTION INTRAVENOUS
Refills: 0 | Status: DISCONTINUED | OUTPATIENT
Start: 2023-01-12 | End: 2023-01-12

## 2023-01-11 RX ORDER — ONDANSETRON 8 MG/1
4 TABLET, FILM COATED ORAL ONCE
Refills: 0 | Status: DISCONTINUED | OUTPATIENT
Start: 2023-01-12 | End: 2023-01-12

## 2023-01-12 ENCOUNTER — TRANSCRIPTION ENCOUNTER (OUTPATIENT)
Age: 63
End: 2023-01-12

## 2023-01-12 ENCOUNTER — OUTPATIENT (OUTPATIENT)
Dept: INPATIENT UNIT | Facility: HOSPITAL | Age: 63
LOS: 1 days | Discharge: HOME | End: 2023-01-12
Payer: COMMERCIAL

## 2023-01-12 ENCOUNTER — RESULT REVIEW (OUTPATIENT)
Age: 63
End: 2023-01-12

## 2023-01-12 ENCOUNTER — APPOINTMENT (OUTPATIENT)
Dept: UROLOGY | Facility: HOSPITAL | Age: 63
End: 2023-01-12

## 2023-01-12 VITALS
OXYGEN SATURATION: 100 % | HEART RATE: 83 BPM | HEIGHT: 68 IN | TEMPERATURE: 97 F | RESPIRATION RATE: 15 BRPM | SYSTOLIC BLOOD PRESSURE: 159 MMHG | WEIGHT: 164.91 LBS | DIASTOLIC BLOOD PRESSURE: 89 MMHG

## 2023-01-12 VITALS
SYSTOLIC BLOOD PRESSURE: 130 MMHG | HEART RATE: 72 BPM | DIASTOLIC BLOOD PRESSURE: 75 MMHG | OXYGEN SATURATION: 100 % | RESPIRATION RATE: 15 BRPM | TEMPERATURE: 98 F

## 2023-01-12 DIAGNOSIS — N20.0 CALCULUS OF KIDNEY: ICD-10-CM

## 2023-01-12 DIAGNOSIS — Z98.890 OTHER SPECIFIED POSTPROCEDURAL STATES: Chronic | ICD-10-CM

## 2023-01-12 LAB
ABO RH CONFIRMATION: SIGNIFICANT CHANGE UP
BLD GP AB SCN SERPL QL: SIGNIFICANT CHANGE UP

## 2023-01-12 PROCEDURE — 93010 ELECTROCARDIOGRAM REPORT: CPT

## 2023-01-12 PROCEDURE — 93005 ELECTROCARDIOGRAM TRACING: CPT

## 2023-01-12 PROCEDURE — 36415 COLL VENOUS BLD VENIPUNCTURE: CPT

## 2023-01-12 PROCEDURE — 88300 SURGICAL PATH GROSS: CPT

## 2023-01-12 PROCEDURE — 86850 RBC ANTIBODY SCREEN: CPT

## 2023-01-12 PROCEDURE — 86901 BLOOD TYPING SEROLOGIC RH(D): CPT

## 2023-01-12 PROCEDURE — 88300 SURGICAL PATH GROSS: CPT | Mod: 26

## 2023-01-12 PROCEDURE — C2617: CPT

## 2023-01-12 PROCEDURE — 76000 FLUOROSCOPY <1 HR PHYS/QHP: CPT

## 2023-01-12 PROCEDURE — C1889: CPT

## 2023-01-12 PROCEDURE — 82365 CALCULUS SPECTROSCOPY: CPT

## 2023-01-12 PROCEDURE — C1769: CPT

## 2023-01-12 PROCEDURE — 86900 BLOOD TYPING SEROLOGIC ABO: CPT

## 2023-01-12 PROCEDURE — 52356 CYSTO/URETERO W/LITHOTRIPSY: CPT | Mod: LT

## 2023-01-12 RX ORDER — OXYCODONE HYDROCHLORIDE 5 MG/1
5 TABLET ORAL ONCE
Refills: 0 | Status: DISCONTINUED | OUTPATIENT
Start: 2023-01-12 | End: 2023-01-12

## 2023-01-12 RX ORDER — PHENAZOPYRIDINE HCL 100 MG
1 TABLET ORAL
Qty: 9 | Refills: 0
Start: 2023-01-12 | End: 2023-01-14

## 2023-01-12 RX ORDER — PHENAZOPYRIDINE HCL 100 MG
200 TABLET ORAL ONCE
Refills: 0 | Status: COMPLETED | OUTPATIENT
Start: 2023-01-12 | End: 2023-01-12

## 2023-01-12 RX ORDER — EMPAGLIFLOZIN 10 MG/1
1 TABLET, FILM COATED ORAL
Qty: 0 | Refills: 0 | DISCHARGE

## 2023-01-12 RX ADMIN — OXYCODONE HYDROCHLORIDE 5 MILLIGRAM(S): 5 TABLET ORAL at 11:34

## 2023-01-12 RX ADMIN — Medication 200 MILLIGRAM(S): at 11:35

## 2023-01-12 NOTE — ASU DISCHARGE PLAN (ADULT/PEDIATRIC) - NS MD DC FALL RISK RISK
For information on Fall & Injury Prevention, visit: [email or web address] OR  [email or web address] OR  https://www.cdc.gov/steadi/patient.html

## 2023-01-12 NOTE — ASU DISCHARGE PLAN (ADULT/PEDIATRIC) - PAIN MANAGEMENT
Take Tylenol ES 2 tabs 3 times a day/Prescriptions electronically submitted to pharmacy from [facility]

## 2023-01-12 NOTE — BRIEF OPERATIVE NOTE - NSICDXBRIEFPROCEDURE_GEN_ALL_CORE_FT
PROCEDURES:  Ureteroscopy, with laser lithotripsy and stent insertion 12-Jan-2023 11:33:09 Left Rahat Layton S

## 2023-01-12 NOTE — ASU PATIENT PROFILE, ADULT - FALL HARM RISK - UNIVERSAL INTERVENTIONS
Bed in lowest position, wheels locked, appropriate side rails in place/Call bell, personal items and telephone in reach/Instruct patient to call for assistance before getting out of bed or chair/Non-slip footwear when patient is out of bed/[employer] to call system/Physically safe environment - no spills, clutter or unnecessary equipment/Purposeful Proactive Rounding/Room/bathroom lighting operational, light cord in reach

## 2023-01-12 NOTE — ASU PATIENT PROFILE, ADULT - NSICDXPASTMEDICALHX_GEN_ALL_CORE_FT
PAST MEDICAL HISTORY:  2019 novel coronavirus disease (COVID-19)     DM (diabetes mellitus)     Gastric ulcer     HLD (hyperlipidemia)     HTN (hypertension)     Kidney stones

## 2023-01-12 NOTE — BRIEF OPERATIVE NOTE - NSICDXBRIEFPOSTOP_GEN_ALL_CORE_FT
POST-OP DIAGNOSIS:  Kidney stone on left side 12-Jan-2023 11:33:45  Rahat Layton  Left ureteral stone 12-Jan-2023 11:34:01  Rahat Layton

## 2023-01-13 LAB — SURGICAL PATHOLOGY STUDY: SIGNIFICANT CHANGE UP

## 2023-01-18 DIAGNOSIS — N13.8 OTHER OBSTRUCTIVE AND REFLUX UROPATHY: ICD-10-CM

## 2023-01-18 DIAGNOSIS — Z79.82 LONG TERM (CURRENT) USE OF ASPIRIN: ICD-10-CM

## 2023-01-18 DIAGNOSIS — N20.2 CALCULUS OF KIDNEY WITH CALCULUS OF URETER: ICD-10-CM

## 2023-01-18 DIAGNOSIS — K27.9 PEPTIC ULCER, SITE UNSPECIFIED, UNSPECIFIED AS ACUTE OR CHRONIC, WITHOUT HEMORRHAGE OR PERFORATION: ICD-10-CM

## 2023-01-18 DIAGNOSIS — N40.1 BENIGN PROSTATIC HYPERPLASIA WITH LOWER URINARY TRACT SYMPTOMS: ICD-10-CM

## 2023-01-18 DIAGNOSIS — I10 ESSENTIAL (PRIMARY) HYPERTENSION: ICD-10-CM

## 2023-01-18 DIAGNOSIS — E78.5 HYPERLIPIDEMIA, UNSPECIFIED: ICD-10-CM

## 2023-01-18 RX ORDER — DIAZEPAM 5 MG/1
5 TABLET ORAL
Qty: 2 | Refills: 0 | Status: COMPLETED | COMMUNITY
Start: 2022-11-02 | End: 2023-01-18

## 2023-01-18 RX ORDER — CEFUROXIME AXETIL 250 MG
1 TABLET ORAL
Qty: 0 | Refills: 0 | DISCHARGE
Start: 2023-01-18 | End: 2023-01-25

## 2023-01-19 ENCOUNTER — INPATIENT (INPATIENT)
Facility: HOSPITAL | Age: 63
LOS: 2 days | Discharge: HOME | DRG: 721 | End: 2023-01-22
Attending: INTERNAL MEDICINE | Admitting: INTERNAL MEDICINE
Payer: COMMERCIAL

## 2023-01-19 VITALS — WEIGHT: 164.91 LBS | HEIGHT: 68 IN

## 2023-01-19 DIAGNOSIS — N12 TUBULO-INTERSTITIAL NEPHRITIS, NOT SPECIFIED AS ACUTE OR CHRONIC: ICD-10-CM

## 2023-01-19 DIAGNOSIS — Z98.890 OTHER SPECIFIED POSTPROCEDURAL STATES: Chronic | ICD-10-CM

## 2023-01-19 PROBLEM — K25.9 GASTRIC ULCER, UNSPECIFIED AS ACUTE OR CHRONIC, WITHOUT HEMORRHAGE OR PERFORATION: Chronic | Status: ACTIVE | Noted: 2023-01-12

## 2023-01-19 PROBLEM — I10 ESSENTIAL (PRIMARY) HYPERTENSION: Chronic | Status: ACTIVE | Noted: 2023-01-12

## 2023-01-19 PROBLEM — U07.1 COVID-19: Chronic | Status: ACTIVE | Noted: 2023-01-12

## 2023-01-19 PROBLEM — E78.5 HYPERLIPIDEMIA, UNSPECIFIED: Chronic | Status: ACTIVE | Noted: 2023-01-12

## 2023-01-19 PROBLEM — E11.9 TYPE 2 DIABETES MELLITUS WITHOUT COMPLICATIONS: Chronic | Status: ACTIVE | Noted: 2023-01-12

## 2023-01-19 PROBLEM — N20.0 CALCULUS OF KIDNEY: Chronic | Status: ACTIVE | Noted: 2023-01-12

## 2023-01-19 LAB
ALBUMIN SERPL ELPH-MCNC: 3.6 G/DL — SIGNIFICANT CHANGE UP (ref 3.3–5)
ALP SERPL-CCNC: 44 U/L — SIGNIFICANT CHANGE UP (ref 40–120)
ALT FLD-CCNC: 24 U/L — SIGNIFICANT CHANGE UP (ref 12–78)
ANION GAP SERPL CALC-SCNC: 10 MMOL/L — SIGNIFICANT CHANGE UP (ref 5–17)
APPEARANCE UR: CLEAR — SIGNIFICANT CHANGE UP
APPEARANCE: CLEAR
APTT BLD: 29.8 SEC — SIGNIFICANT CHANGE UP (ref 27.5–35.5)
AST SERPL-CCNC: 13 U/L — LOW (ref 15–37)
BACTERIA # UR AUTO: ABNORMAL
BACTERIA: NEGATIVE
BASOPHILS # BLD AUTO: 0 K/UL — SIGNIFICANT CHANGE UP (ref 0–0.2)
BASOPHILS NFR BLD AUTO: 0 % — SIGNIFICANT CHANGE UP (ref 0–2)
BILIRUB SERPL-MCNC: 0.8 MG/DL — SIGNIFICANT CHANGE UP (ref 0.2–1.2)
BILIRUB UR-MCNC: NEGATIVE — SIGNIFICANT CHANGE UP
BILIRUBIN URINE: NEGATIVE
BLOOD URINE: ABNORMAL
BUN SERPL-MCNC: 24 MG/DL — HIGH (ref 7–23)
CALCIUM SERPL-MCNC: 9.2 MG/DL — SIGNIFICANT CHANGE UP (ref 8.5–10.1)
CHLORIDE SERPL-SCNC: 97 MMOL/L — SIGNIFICANT CHANGE UP (ref 96–108)
CO2 SERPL-SCNC: 25 MMOL/L — SIGNIFICANT CHANGE UP (ref 22–31)
COLOR SPEC: YELLOW — SIGNIFICANT CHANGE UP
COLOR: NORMAL
COMMENT - URINE: SIGNIFICANT CHANGE UP
CREAT SERPL-MCNC: 1.47 MG/DL — HIGH (ref 0.5–1.3)
DIFF PNL FLD: ABNORMAL
EGFR: 53 ML/MIN/1.73M2 — LOW
EOSINOPHIL # BLD AUTO: 0 K/UL — SIGNIFICANT CHANGE UP (ref 0–0.5)
EOSINOPHIL NFR BLD AUTO: 0 % — SIGNIFICANT CHANGE UP (ref 0–6)
EPI CELLS # UR: NEGATIVE — SIGNIFICANT CHANGE UP
FLUAV AG NPH QL: SIGNIFICANT CHANGE UP
FLUBV AG NPH QL: SIGNIFICANT CHANGE UP
GLUCOSE BLDC GLUCOMTR-MCNC: 149 MG/DL — HIGH (ref 70–99)
GLUCOSE BLDC GLUCOMTR-MCNC: 156 MG/DL — HIGH (ref 70–99)
GLUCOSE BLDC GLUCOMTR-MCNC: 234 MG/DL — HIGH (ref 70–99)
GLUCOSE BLDC GLUCOMTR-MCNC: 237 MG/DL — HIGH (ref 70–99)
GLUCOSE QUALITATIVE U: ABNORMAL
GLUCOSE SERPL-MCNC: 207 MG/DL — HIGH (ref 70–99)
GLUCOSE UR QL: 1000 MG/DL
HCT VFR BLD CALC: 39.1 % — SIGNIFICANT CHANGE UP (ref 39–50)
HGB BLD-MCNC: 13.9 G/DL — SIGNIFICANT CHANGE UP (ref 13–17)
HYALINE CASTS: 2 /LPF
INR BLD: 1.14 RATIO — SIGNIFICANT CHANGE UP (ref 0.88–1.16)
KETONES UR-MCNC: ABNORMAL
KETONES URINE: NORMAL
LACTATE SERPL-SCNC: 1.3 MMOL/L — SIGNIFICANT CHANGE UP (ref 0.7–2)
LEUKOCYTE ESTERASE UR-ACNC: ABNORMAL
LEUKOCYTE ESTERASE URINE: ABNORMAL
LYMPHOCYTES # BLD AUTO: 0.64 K/UL — LOW (ref 1–3.3)
LYMPHOCYTES # BLD AUTO: 3 % — LOW (ref 13–44)
MANUAL SMEAR VERIFICATION: SIGNIFICANT CHANGE UP
MCHC RBC-ENTMCNC: 32.9 PG — SIGNIFICANT CHANGE UP (ref 27–34)
MCHC RBC-ENTMCNC: 35.5 GM/DL — SIGNIFICANT CHANGE UP (ref 32–36)
MCV RBC AUTO: 92.4 FL — SIGNIFICANT CHANGE UP (ref 80–100)
MICROSCOPIC-UA: NORMAL
MONOCYTES # BLD AUTO: 2.15 K/UL — HIGH (ref 0–0.9)
MONOCYTES NFR BLD AUTO: 10 % — SIGNIFICANT CHANGE UP (ref 2–14)
NEUTROPHILS # BLD AUTO: 18.68 K/UL — HIGH (ref 1.8–7.4)
NEUTROPHILS NFR BLD AUTO: 86 % — HIGH (ref 43–77)
NEUTS BAND # BLD: 1 % — SIGNIFICANT CHANGE UP (ref 0–8)
NITRITE UR-MCNC: NEGATIVE — SIGNIFICANT CHANGE UP
NITRITE URINE: NEGATIVE
NRBC # BLD: 0 /100 — SIGNIFICANT CHANGE UP (ref 0–0)
NRBC # BLD: SIGNIFICANT CHANGE UP /100 WBCS (ref 0–0)
PH UR: 5 — SIGNIFICANT CHANGE UP (ref 5–8)
PH URINE: 5.5
PLAT MORPH BLD: NORMAL — SIGNIFICANT CHANGE UP
PLATELET # BLD AUTO: 261 K/UL — SIGNIFICANT CHANGE UP (ref 150–400)
POTASSIUM SERPL-MCNC: 4.2 MMOL/L — SIGNIFICANT CHANGE UP (ref 3.5–5.3)
POTASSIUM SERPL-SCNC: 4.2 MMOL/L — SIGNIFICANT CHANGE UP (ref 3.5–5.3)
PROT SERPL-MCNC: 7.8 GM/DL — SIGNIFICANT CHANGE UP (ref 6–8.3)
PROT UR-MCNC: 15
PROTEIN URINE: ABNORMAL
PROTHROM AB SERPL-ACNC: 13.3 SEC — SIGNIFICANT CHANGE UP (ref 10.5–13.4)
RBC # BLD: 4.23 M/UL — SIGNIFICANT CHANGE UP (ref 4.2–5.8)
RBC # FLD: 11.9 % — SIGNIFICANT CHANGE UP (ref 10.3–14.5)
RBC BLD AUTO: NORMAL — SIGNIFICANT CHANGE UP
RBC CASTS # UR COMP ASSIST: >50 /HPF (ref 0–4)
RED BLOOD CELLS URINE: 8 /HPF
RSV RNA NPH QL NAA+NON-PROBE: SIGNIFICANT CHANGE UP
SARS-COV-2 RNA SPEC QL NAA+PROBE: SIGNIFICANT CHANGE UP
SODIUM SERPL-SCNC: 132 MMOL/L — LOW (ref 135–145)
SP GR SPEC: 1.01 — SIGNIFICANT CHANGE UP (ref 1.01–1.02)
SPECIFIC GRAVITY URINE: 1.03
SQUAMOUS EPITHELIAL CELLS: 0 /HPF
UROBILINOGEN FLD QL: NEGATIVE — SIGNIFICANT CHANGE UP
UROBILINOGEN URINE: NORMAL
WBC # BLD: 21.47 K/UL — HIGH (ref 3.8–10.5)
WBC # FLD AUTO: 21.47 K/UL — HIGH (ref 3.8–10.5)
WBC UR QL: >50 /HPF (ref 0–5)
WHITE BLOOD CELLS URINE: 36 /HPF

## 2023-01-19 PROCEDURE — 93010 ELECTROCARDIOGRAM REPORT: CPT

## 2023-01-19 PROCEDURE — 83036 HEMOGLOBIN GLYCOSYLATED A1C: CPT

## 2023-01-19 PROCEDURE — 80048 BASIC METABOLIC PNL TOTAL CA: CPT

## 2023-01-19 PROCEDURE — 83735 ASSAY OF MAGNESIUM: CPT

## 2023-01-19 PROCEDURE — 86803 HEPATITIS C AB TEST: CPT

## 2023-01-19 PROCEDURE — 82962 GLUCOSE BLOOD TEST: CPT

## 2023-01-19 PROCEDURE — 85027 COMPLETE CBC AUTOMATED: CPT

## 2023-01-19 PROCEDURE — 85025 COMPLETE CBC W/AUTO DIFF WBC: CPT

## 2023-01-19 PROCEDURE — 99222 1ST HOSP IP/OBS MODERATE 55: CPT

## 2023-01-19 PROCEDURE — 36415 COLL VENOUS BLD VENIPUNCTURE: CPT

## 2023-01-19 PROCEDURE — 99285 EMERGENCY DEPT VISIT HI MDM: CPT

## 2023-01-19 PROCEDURE — 74177 CT ABD & PELVIS W/CONTRAST: CPT | Mod: 26,MA

## 2023-01-19 RX ORDER — KETOROLAC TROMETHAMINE 30 MG/ML
30 SYRINGE (ML) INJECTION ONCE
Refills: 0 | Status: DISCONTINUED | OUTPATIENT
Start: 2023-01-19 | End: 2023-01-19

## 2023-01-19 RX ORDER — SODIUM CHLORIDE 9 MG/ML
1000 INJECTION INTRAMUSCULAR; INTRAVENOUS; SUBCUTANEOUS
Refills: 0 | Status: DISCONTINUED | OUTPATIENT
Start: 2023-01-19 | End: 2023-01-21

## 2023-01-19 RX ORDER — GLUCAGON INJECTION, SOLUTION 0.5 MG/.1ML
1 INJECTION, SOLUTION SUBCUTANEOUS ONCE
Refills: 0 | Status: DISCONTINUED | OUTPATIENT
Start: 2023-01-19 | End: 2023-01-22

## 2023-01-19 RX ORDER — LOSARTAN POTASSIUM 100 MG/1
50 TABLET, FILM COATED ORAL DAILY
Refills: 0 | Status: DISCONTINUED | OUTPATIENT
Start: 2023-01-19 | End: 2023-01-22

## 2023-01-19 RX ORDER — SODIUM CHLORIDE 9 MG/ML
1000 INJECTION, SOLUTION INTRAVENOUS
Refills: 0 | Status: DISCONTINUED | OUTPATIENT
Start: 2023-01-19 | End: 2023-01-22

## 2023-01-19 RX ORDER — IBUPROFEN 200 MG
600 TABLET ORAL ONCE
Refills: 0 | Status: DISCONTINUED | OUTPATIENT
Start: 2023-01-19 | End: 2023-01-19

## 2023-01-19 RX ORDER — ACETAMINOPHEN 500 MG
650 TABLET ORAL EVERY 6 HOURS
Refills: 0 | Status: DISCONTINUED | OUTPATIENT
Start: 2023-01-19 | End: 2023-01-22

## 2023-01-19 RX ORDER — SODIUM CHLORIDE 9 MG/ML
2300 INJECTION, SOLUTION INTRAVENOUS ONCE
Refills: 0 | Status: COMPLETED | OUTPATIENT
Start: 2023-01-19 | End: 2023-01-19

## 2023-01-19 RX ORDER — DEXTROSE 50 % IN WATER 50 %
12.5 SYRINGE (ML) INTRAVENOUS ONCE
Refills: 0 | Status: DISCONTINUED | OUTPATIENT
Start: 2023-01-19 | End: 2023-01-22

## 2023-01-19 RX ORDER — DEXTROSE 50 % IN WATER 50 %
25 SYRINGE (ML) INTRAVENOUS ONCE
Refills: 0 | Status: DISCONTINUED | OUTPATIENT
Start: 2023-01-19 | End: 2023-01-22

## 2023-01-19 RX ORDER — LANOLIN ALCOHOL/MO/W.PET/CERES
3 CREAM (GRAM) TOPICAL AT BEDTIME
Refills: 0 | Status: DISCONTINUED | OUTPATIENT
Start: 2023-01-19 | End: 2023-01-22

## 2023-01-19 RX ORDER — IBUPROFEN 200 MG
1 TABLET ORAL
Qty: 0 | Refills: 0 | DISCHARGE

## 2023-01-19 RX ORDER — DIAZEPAM 5 MG
10 TABLET ORAL ONCE
Refills: 0 | Status: DISCONTINUED | OUTPATIENT
Start: 2023-01-19 | End: 2023-01-19

## 2023-01-19 RX ORDER — CEFEPIME 1 G/1
2000 INJECTION, POWDER, FOR SOLUTION INTRAMUSCULAR; INTRAVENOUS ONCE
Refills: 0 | Status: COMPLETED | OUTPATIENT
Start: 2023-01-19 | End: 2023-01-19

## 2023-01-19 RX ORDER — SENNA PLUS 8.6 MG/1
2 TABLET ORAL AT BEDTIME
Refills: 0 | Status: DISCONTINUED | OUTPATIENT
Start: 2023-01-19 | End: 2023-01-22

## 2023-01-19 RX ORDER — INSULIN LISPRO 100/ML
VIAL (ML) SUBCUTANEOUS AT BEDTIME
Refills: 0 | Status: DISCONTINUED | OUTPATIENT
Start: 2023-01-19 | End: 2023-01-22

## 2023-01-19 RX ORDER — ONDANSETRON 8 MG/1
4 TABLET, FILM COATED ORAL EVERY 8 HOURS
Refills: 0 | Status: DISCONTINUED | OUTPATIENT
Start: 2023-01-19 | End: 2023-01-22

## 2023-01-19 RX ORDER — ENOXAPARIN SODIUM 100 MG/ML
40 INJECTION SUBCUTANEOUS EVERY 24 HOURS
Refills: 0 | Status: DISCONTINUED | OUTPATIENT
Start: 2023-01-19 | End: 2023-01-22

## 2023-01-19 RX ORDER — ASPIRIN/CALCIUM CARB/MAGNESIUM 324 MG
81 TABLET ORAL DAILY
Refills: 0 | Status: DISCONTINUED | OUTPATIENT
Start: 2023-01-19 | End: 2023-01-22

## 2023-01-19 RX ORDER — OXYCODONE HYDROCHLORIDE 5 MG/1
1 TABLET ORAL
Qty: 0 | Refills: 0 | DISCHARGE

## 2023-01-19 RX ORDER — DEXTROSE 50 % IN WATER 50 %
15 SYRINGE (ML) INTRAVENOUS ONCE
Refills: 0 | Status: DISCONTINUED | OUTPATIENT
Start: 2023-01-19 | End: 2023-01-22

## 2023-01-19 RX ORDER — TAMSULOSIN HYDROCHLORIDE 0.4 MG/1
0.4 CAPSULE ORAL AT BEDTIME
Refills: 0 | Status: DISCONTINUED | OUTPATIENT
Start: 2023-01-19 | End: 2023-01-22

## 2023-01-19 RX ORDER — VANCOMYCIN HCL 1 G
1000 VIAL (EA) INTRAVENOUS ONCE
Refills: 0 | Status: COMPLETED | OUTPATIENT
Start: 2023-01-19 | End: 2023-01-19

## 2023-01-19 RX ORDER — CEFTRIAXONE 500 MG/1
2000 INJECTION, POWDER, FOR SOLUTION INTRAMUSCULAR; INTRAVENOUS EVERY 24 HOURS
Refills: 0 | Status: DISCONTINUED | OUTPATIENT
Start: 2023-01-19 | End: 2023-01-20

## 2023-01-19 RX ORDER — INSULIN LISPRO 100/ML
VIAL (ML) SUBCUTANEOUS
Refills: 0 | Status: DISCONTINUED | OUTPATIENT
Start: 2023-01-19 | End: 2023-01-22

## 2023-01-19 RX ADMIN — SODIUM CHLORIDE 100 MILLILITER(S): 9 INJECTION INTRAMUSCULAR; INTRAVENOUS; SUBCUTANEOUS at 22:10

## 2023-01-19 RX ADMIN — Medication 650 MILLIGRAM(S): at 17:23

## 2023-01-19 RX ADMIN — Medication 250 MILLIGRAM(S): at 07:25

## 2023-01-19 RX ADMIN — SENNA PLUS 2 TABLET(S): 8.6 TABLET ORAL at 21:11

## 2023-01-19 RX ADMIN — Medication 81 MILLIGRAM(S): at 13:30

## 2023-01-19 RX ADMIN — SODIUM CHLORIDE 100 MILLILITER(S): 9 INJECTION INTRAMUSCULAR; INTRAVENOUS; SUBCUTANEOUS at 13:30

## 2023-01-19 RX ADMIN — Medication 4: at 17:10

## 2023-01-19 RX ADMIN — TAMSULOSIN HYDROCHLORIDE 0.4 MILLIGRAM(S): 0.4 CAPSULE ORAL at 13:30

## 2023-01-19 RX ADMIN — Medication 650 MILLIGRAM(S): at 22:00

## 2023-01-19 RX ADMIN — CEFTRIAXONE 2000 MILLIGRAM(S): 500 INJECTION, POWDER, FOR SOLUTION INTRAMUSCULAR; INTRAVENOUS at 11:43

## 2023-01-19 RX ADMIN — CEFEPIME 100 MILLIGRAM(S): 1 INJECTION, POWDER, FOR SOLUTION INTRAMUSCULAR; INTRAVENOUS at 05:42

## 2023-01-19 RX ADMIN — Medication 650 MILLIGRAM(S): at 15:44

## 2023-01-19 RX ADMIN — SODIUM CHLORIDE 2300 MILLILITER(S): 9 INJECTION, SOLUTION INTRAVENOUS at 04:43

## 2023-01-19 RX ADMIN — Medication 10 MILLIGRAM(S): at 05:42

## 2023-01-19 RX ADMIN — Medication 650 MILLIGRAM(S): at 22:05

## 2023-01-19 RX ADMIN — ENOXAPARIN SODIUM 40 MILLIGRAM(S): 100 INJECTION SUBCUTANEOUS at 11:44

## 2023-01-19 RX ADMIN — Medication 600 MILLIGRAM(S): at 04:44

## 2023-01-19 NOTE — ED PROVIDER NOTE - PHYSICAL EXAMINATION
***GEN - NAD; well appearing; A+O x3 ***HEAD - NC/AT ***EYES/NOSE - PERRL, EOMI, mucous membranes moist, no discharge ***THROAT: Oral cavity and pharynx normal. No inflammation, swelling, exudate, or lesions.  ***NECK: Neck supple, non-tender without lymphadenopathy, no masses, no thyromegaly.   ***PULMONARY - CTA b/l, symmetric breath sounds. ***CARDIAC -s1s2, +tachycardic, no M,G,R  ***ABDOMEN - +BS, ND, NT, soft, no guarding, no rebound, no masses   ***BACK - no CVA tenderness, Normal  spine ***EXTREMITIES - symmetric pulses, 2+ dp, capillary refill < 2 seconds, no clubbing, no cyanosis, no edema ***SKIN - no rash or bruising   ***NEUROLOGIC - alert, CN 2-12 intact

## 2023-01-19 NOTE — H&P ADULT - NSHPLABSRESULTS_GEN_ALL_CORE
All Labs/EKG/Radiology/Meds reviewed by me.     Lab Results:  CBC  CBC Full  -  ( 2023 04:20 )  WBC Count : 21.47 K/uL  RBC Count : 4.23 M/uL  Hemoglobin : 13.9 g/dL  Hematocrit : 39.1 %  Platelet Count - Automated : 261 K/uL  Mean Cell Volume : 92.4 fl  Mean Cell Hemoglobin : 32.9 pg  Mean Cell Hemoglobin Concentration : 35.5 gm/dL  Auto Neutrophil # : 18.68 K/uL  Auto Lymphocyte # : 0.64 K/uL  Auto Monocyte # : 2.15 K/uL  Auto Eosinophil # : 0.00 K/uL  Auto Basophil # : 0.00 K/uL  Auto Neutrophil % : 86.0 %  Auto Lymphocyte % : 3.0 %  Auto Monocyte % : 10.0 %  Auto Eosinophil % : 0.0 %  Auto Basophil % : 0.0 %    .		Differential:	[] Automated		[] Manual  Chemistry                        13.9   21.47 )-----------( 261      ( 2023 04:20 )             39.1         132<L>  |  97  |  24<H>  ----------------------------<  207<H>  4.2   |  25  |  1.47<H>    Ca    9.2      2023 04:20    TPro  7.8  /  Alb  3.6  /  TBili  0.8  /  DBili  x   /  AST  13<L>  /  ALT  24  /  AlkPhos  44  -    LIVER FUNCTIONS - ( 2023 04:20 )  Alb: 3.6 g/dL / Pro: 7.8 gm/dL / ALK PHOS: 44 U/L / ALT: 24 U/L / AST: 13 U/L / GGT: x           PT/INR - ( 2023 04:20 )   PT: 13.3 sec;   INR: 1.14 ratio         PTT - ( 2023 04:20 )  PTT:29.8 sec  Urinalysis Basic - ( 2023 04:20 )    Color: Yellow / Appearance: Clear / S.015 / pH: x  Gluc: x / Ketone: Moderate  / Bili: Negative / Urobili: Negative   Blood: x / Protein: 15 / Nitrite: Negative   Leuk Esterase: Moderate / RBC: >50 /HPF / WBC >50 /HPF   Sq Epi: x / Non Sq Epi: Negative / Bacteria: Moderate    RADIOLOGY RESULTS:    < from: CT Abdomen and Pelvis w/ IV Cont (23 @ 06:54) >    Left-sided ureteral stent in place. Very mild left hydroureteronephrosis.   Mild left perinephric fat stranding. Suggestion of minimal enhancement   along left renal pelvis and proximal ureter. Correlate clinically for   infection. Comparison with prior imaging could be helpful.    < end of copied text >        MEDICATIONS  (STANDING):  aspirin  chewable 81 milliGRAM(s) Oral daily  cefTRIAXone Injectable. 2000 milliGRAM(s) IV Push every 24 hours  dextrose 5%. 1000 milliLiter(s) (50 mL/Hr) IV Continuous <Continuous>  dextrose 5%. 1000 milliLiter(s) (100 mL/Hr) IV Continuous <Continuous>  dextrose 50% Injectable 25 Gram(s) IV Push once  dextrose 50% Injectable 12.5 Gram(s) IV Push once  dextrose 50% Injectable 25 Gram(s) IV Push once  enoxaparin Injectable 40 milliGRAM(s) SubCutaneous every 24 hours  glucagon  Injectable 1 milliGRAM(s) IntraMuscular once  insulin lispro (ADMELOG) corrective regimen sliding scale   SubCutaneous three times a day before meals  insulin lispro (ADMELOG) corrective regimen sliding scale   SubCutaneous at bedtime  losartan 50 milliGRAM(s) Oral daily  sodium chloride 0.9%. 1000 milliLiter(s) (100 mL/Hr) IV Continuous <Continuous>  tamsulosin 0.4 milliGRAM(s) Oral at bedtime    MEDICATIONS  (PRN):  acetaminophen     Tablet .. 650 milliGRAM(s) Oral every 6 hours PRN Temp greater or equal to 38C (100.4F), Mild Pain (1 - 3)  aluminum hydroxide/magnesium hydroxide/simethicone Suspension 30 milliLiter(s) Oral every 4 hours PRN Dyspepsia  dextrose Oral Gel 15 Gram(s) Oral once PRN Blood Glucose LESS THAN 70 milliGRAM(s)/deciliter  melatonin 3 milliGRAM(s) Oral at bedtime PRN Insomnia  ondansetron Injectable 4 milliGRAM(s) IV Push every 8 hours PRN Nausea and/or Vomiting

## 2023-01-19 NOTE — ED ADULT NURSE NOTE - OBJECTIVE STATEMENT
PT presents to ED c/o painful urination and fever TMAX 102.2 following kidney stone removal and ureter stent placement 7 days ago. PT denies nvd. PT skin color appropriate for race, respirations even and unlabored. ED workup in progress, will continue to monitor. Kervin Salcido RN

## 2023-01-19 NOTE — ED PROVIDER NOTE - CLINICAL SUMMARY MEDICAL DECISION MAKING FREE TEXT BOX
62-year-old male with fever and dysuria after recent lithotripsy and ureteral stent placement by Dr. Layton presents with worsening symptoms.  Patient started antibiotics last night that were called in by his urologist but was only able to take 1 dose prior to symptoms worsening.  Reports shaking chills.  Patient empirically treated for sepsis with broad-spectrum coverage antibiotics, fluids, pain control and medication to reduce his fever.  Sepsis work-up obtained with blood cultures and urine cultures.  CT abdomen pelvis with IV contrast ordered however patient reported some anxiety having a CAT scan secondary to claustrophobia.  He typically takes Valium for the symptoms.  Patient was given a dose of Valium and then able to go over to CT[...]

## 2023-01-19 NOTE — ED ADULT NURSE REASSESSMENT NOTE - NS ED NURSE REASSESS COMMENT FT1
Pt received A&Ox4 VSS afebrile. Pt feeling better, less "achey". Pt voiding david colored urine in urinal. Plan of care reviewed. Profile complete. Report given to 1N and pt put in for transport.

## 2023-01-19 NOTE — PATIENT PROFILE ADULT - FALL HARM RISK - UNIVERSAL INTERVENTIONS
Bed in lowest position, wheels locked, appropriate side rails in place/Call bell, personal items and telephone in reach/Instruct patient to call for assistance before getting out of bed or chair/Non-slip footwear when patient is out of bed/[place] to call system/Physically safe environment - no spills, clutter or unnecessary equipment/Purposeful Proactive Rounding/Room/bathroom lighting operational, light cord in reach

## 2023-01-19 NOTE — H&P ADULT - NSHPPHYSICALEXAM_GEN_ALL_CORE
PHYSICAL EXAM:    Daily Height in cm: 172.72 (19 Jan 2023 03:00)    Daily     Vital Signs Last 24 Hrs  T(C): 36.6 (19 Jan 2023 08:25), Max: 38.1 (19 Jan 2023 03:01)  T(F): 97.8 (19 Jan 2023 08:25), Max: 100.6 (19 Jan 2023 03:01)  HR: 75 (19 Jan 2023 08:25) (75 - 108)  BP: 102/62 (19 Jan 2023 08:25) (100/57 - 119/70)  BP(mean): 72 (19 Jan 2023 08:25) (69 - 78)  RR: 18 (19 Jan 2023 08:25) (15 - 18)  SpO2: 100% (19 Jan 2023 08:25) (96% - 100%)    Constitutional: Weak  appearing  HEENT: Atraumatic, JANINE, Normal, No congestion  Respiratory: Breath Sounds normal, no rhonchi/wheeze  Cardiovascular: N S1S2;   Gastrointestinal: Abdomen soft, non tender, Bowel Sounds present  Extremities: No edema, peripheral pulses present  Neurological: AAO x 3, no gross focal motor deficits  Skin: Non cellulitic, no rash, ulcers  Lymph Nodes: No lymphadenopathy noted  Back: No CVA tenderness   Musculoskeletal: non tender  Breasts: Deferred  Genitourinary: deferred  Rectal: Deferred

## 2023-01-19 NOTE — CONSULT NOTE ADULT - ASSESSMENT
61 yo male with PMH as above with hx of renal stones s/p lithotripsy of left ureteral stone with ureteral stent placement by Dr. Layton on 1/12/23. Now admitted with Sepsis/ left pyelonephritis.  CT with Left-sided ureteral stent, tips within the left renal   pelvis and urinary bladder. Very mild left hydroureteronephrosis. Mild   left perinephric fat stranding. Suggestion of minimal enhancement along   left renal pelvis and proximal ureter correlate clinically for infection.   No right hydronephrosis. Nonobstructing right intrarenal calculus.    Discussed with patient and wife at bedside, that Dr. Layton will decide on when lithotripsy of the other stone should be scheduled based on culture results and antibiotic course. Discussed it is not recommended to have lithotripsy with an active infection. Explained risks/ benefits with patient, he verbalized understanding.  Recommend  - Continue IV antibiotics, adjust as per cultures/ sensitivities.   - Follow up with Dr. Layton for further management of the other left renal stone and stent.     Case discussed with Dr. Layton

## 2023-01-19 NOTE — H&P ADULT - HISTORY OF PRESENT ILLNESS
62/M with PMHX of DM 2, HTN, Renal stones s/p lithotripsy for stone on the left with ureteral stent placed by Dr. Layton on 1/12/23; came to ED for c/o  fever and shaking chills.  Patient states that he called Dr. Layton yesterday and was sent to the lab to have his urine checked for infection and was started on an antibiotic.  He was only able to take 1 dose and last night symptoms worsened and he came to the ED. At home fevers ranging between 100 and 103.     CT abdo showed Left Pyelonephritis.

## 2023-01-19 NOTE — ED PROVIDER NOTE - NS ED ROS FT
Constitutional: + fever and chills  Eyes: No visual changes  HEENT: No throat pain  CV: No chest pain  Resp: No SOB no cough  GI: No abd pain, nausea or vomiting  : + dysuria and burning  MSK: + left back pain (unchanged)  Skin: No rash  Neuro: No headache

## 2023-01-19 NOTE — ED ADULT TRIAGE NOTE - CHIEF COMPLAINT QUOTE
ambulatory to triage with complaints of fever since yesterday, temp high of 103 at home. took tylenol 1 hour prior to arrival.   laser lithotripsy on thursday by urologist Dr. glass. prescribed cefuroxime. complains of burning pain during urination. denies flank pain. denies hematuria.

## 2023-01-19 NOTE — H&P ADULT - ASSESSMENT
62/M with PMHX of DM 2, HTN, Renal stones s/p lithotripsy for stone on the left with ureteral stent placed by Dr. Layton on 1/12/23; came to ED for c/o  fever and shaking chills.  Patient states that he called Dr. Layton yesterday and was sent to the lab to have his urine checked for infection and was started on an antibiotic.  He was only able to take 1 dose and last night symptoms worsened and he came to the ED. At home fevers ranging between 100 and 103.     CT abdo showed Left Pyelonephritis.     Pt admitted with :    Fever  Leukocytosis, 21 k  UTI  Sepsis; present on admission  Left Pyelonephritis  Left ureteral stent  done on 1/1/2/23  DM2   HTN  Elevated Cr 1.47; improving since last value of 1.82 0n 1/9/23    PLAN: admit to med floor  iv fluids  iv ceftriaxone 2 g daily   f/u blood/urine cx  FS +ISS  CBC, BMP in am    DVT PPX: lovenox    POC discussed with pt, family,  team , Dr. Layton.

## 2023-01-19 NOTE — ED PROVIDER NOTE - OBJECTIVE STATEMENT
62-year-old male with history of kidney stones status post lithotripsy for stone on the left with ureteral stent placed by Dr. Layton presents with fever and shaking chills.  Patient states that he called Dr. Layton yesterday and was sent to the lab to have his urine checked for infection and started on an antibiotic.  He was only able to take 1 dose and last night symptoms worsened and he came to the ED.

## 2023-01-19 NOTE — CONSULT NOTE ADULT - SUBJECTIVE AND OBJECTIVE BOX
HPI:  62/M with PMHX of DM 2, HTN, Renal stones s/p lithotripsy for stone on the left with ureteral stent placed by Dr. Layton on 23; came to ED for c/o  fever and shaking chills.  Patient states that he called Dr. Layton yesterday and was sent to the lab to have his urine checked for infection and was started on an antibiotic.  He was only able to take 1 dose and last night symptoms worsened and he came to the ED. At home fevers ranging between 100 and 103.     CT abdo showed Left Pyelonephritis.  (2023 11:44)    63 yo male with PMH as above with hx of renal stones s/p lithotripsy of left ureteral stone with ureteral stent placement by Dr. Layton on 23. Patient was doing well post op but couple of days ago developed fevers, chills and was sent to outpt labs for urinalysis and placed on oral abx. Patient reports he continued to have worsening symptoms with high fevers prompting him to come to the ER. Patient reports he notes some left flank discomfort along with suprapubic pressure when he voids.       PAST MEDICAL & SURGICAL HISTORY:  HTN (hypertension)      HLD (hyperlipidemia)      DM (diabetes mellitus)      Gastric ulcer      Kidney stones      2019 novel coronavirus disease (COVID-19)      H/O lithotripsy  x 3 stent placement x1      History of hernia repair  x2          REVIEW OF SYSTEMS    All other review of systems neg, except as noted in HPI  MEDICATIONS  (STANDING):  aspirin  chewable 81 milliGRAM(s) Oral daily  cefTRIAXone Injectable. 2000 milliGRAM(s) IV Push every 24 hours  dextrose 5%. 1000 milliLiter(s) (50 mL/Hr) IV Continuous <Continuous>  dextrose 5%. 1000 milliLiter(s) (100 mL/Hr) IV Continuous <Continuous>  dextrose 50% Injectable 25 Gram(s) IV Push once  dextrose 50% Injectable 12.5 Gram(s) IV Push once  dextrose 50% Injectable 25 Gram(s) IV Push once  enoxaparin Injectable 40 milliGRAM(s) SubCutaneous every 24 hours  glucagon  Injectable 1 milliGRAM(s) IntraMuscular once  insulin lispro (ADMELOG) corrective regimen sliding scale   SubCutaneous three times a day before meals  insulin lispro (ADMELOG) corrective regimen sliding scale   SubCutaneous at bedtime  losartan 50 milliGRAM(s) Oral daily  sodium chloride 0.9%. 1000 milliLiter(s) (100 mL/Hr) IV Continuous <Continuous>  tamsulosin 0.4 milliGRAM(s) Oral at bedtime    MEDICATIONS  (PRN):  acetaminophen     Tablet .. 650 milliGRAM(s) Oral every 6 hours PRN Temp greater or equal to 38C (100.4F), Mild Pain (1 - 3)  aluminum hydroxide/magnesium hydroxide/simethicone Suspension 30 milliLiter(s) Oral every 4 hours PRN Dyspepsia  dextrose Oral Gel 15 Gram(s) Oral once PRN Blood Glucose LESS THAN 70 milliGRAM(s)/deciliter  melatonin 3 milliGRAM(s) Oral at bedtime PRN Insomnia  ondansetron Injectable 4 milliGRAM(s) IV Push every 8 hours PRN Nausea and/or Vomiting      Allergies    Crestor (Muscle Pain)    Intolerances        SOCIAL HISTORY:    FAMILY HISTORY:  FH: CAD (coronary artery disease) (Father)        Vital Signs Last 24 Hrs  T(C): 37.1 (2023 15:21), Max: 38.1 (2023 03:01)  T(F): 98.8 (2023 15:21), Max: 100.6 (2023 03:01)  HR: 90 (2023 15:21) (75 - 108)  BP: 98/58 (2023 15:21) (98/58 - 119/70)  BP(mean): 72 (2023 08:25) (69 - 78)  RR: 18 (2023 15:21) (15 - 18)  SpO2: 100% (2023 15:21) (96% - 100%)    Parameters below as of 2023 15:21  Patient On (Oxygen Delivery Method): room air        PHYSICAL EXAM:    General: No distress, No anxiety  VITALS  T(C): 37.1 (23 @ 15:21), Max: 38.1 (23 @ 03:01)  HR: 90 (23 @ 15:21) (75 - 108)  BP: 98/58 (23 @ 15:21) (98/58 - 119/70)  RR: 18 (23 @ 15:21) (15 - 18)  SpO2: 100% (23 @ 15:21) (96% - 100%)            Skin     : No jaundice  HEENT: Normocephalic, no icterus , EOM full , No epistaxis  Lung    : No resp distress  Abdo:   : Soft, Non tender, No guarding, No distension   Back    : No CVAT b/l  Genitalia Male: No Rivers,  Neuro   : A&Ox3          LABS:                        13.9   21.47 )-----------( 261      ( 2023 04:20 )             39.1         132<L>  |  97  |  24<H>  ----------------------------<  207<H>  4.2   |  25  |  1.47<H>    Ca    9.2      2023 04:20    TPro  7.8  /  Alb  3.6  /  TBili  0.8  /  DBili  x   /  AST  13<L>  /  ALT  24  /  AlkPhos  44      PT/INR - ( 2023 04:20 )   PT: 13.3 sec;   INR: 1.14 ratio         PTT - ( 2023 04:20 )  PTT:29.8 sec  Urinalysis Basic - ( 2023 04:20 )    Color: Yellow / Appearance: Clear / S.015 / pH: x  Gluc: x / Ketone: Moderate  / Bili: Negative / Urobili: Negative   Blood: x / Protein: 15 / Nitrite: Negative   Leuk Esterase: Moderate / RBC: >50 /HPF / WBC >50 /HPF   Sq Epi: x / Non Sq Epi: Negative / Bacteria: Moderate        RADIOLOGY & ADDITIONAL STUDIES:  < from: CT Abdomen and Pelvis w/ IV Cont (23 @ 06:54) >    ACC: [id number] EXAM:  CT ABDOMEN AND PELVIS IC                          PROCEDURE DATE:  2023          INTERPRETATION:  CLINICAL INFORMATION: Left ureteral stent, fever    COMPARISON: None.    CONTRAST/COMPLICATIONS:  IV Contrast: Omnipaque 350  100 cc administered   0 cc discarded  Oral Contrast: NONE  Complications: None reported at time of study completion    PROCEDURE:  CT of the Abdomen and Pelvis was performed.  Sagittal and coronal reformats were performed.    FINDINGS:  LOWER CHEST: 2 mm left lower lung nodule (2:8).    LIVER: Within normal limits.  BILE DUCTS: Normal caliber.  GALLBLADDER: Within normal limits.  SPLEEN: Within normal limits.  PANCREAS: A few punctate calcifications at the splenic tail. Mild   prominence of thepancreatic duct in the region of the body/head,   nonspecific.  ADRENALS: Within normal limits.  KIDNEYS/URETERS: Left-sided ureteral stent, tips within the left renal   pelvis and urinary bladder. Very mild left hydroureteronephrosis. Mild   left perinephric fat stranding. Suggestion of minimal enhancement along   left renal pelvis and proximal ureter correlate clinically for infection.   No right hydronephrosis. Nonobstructing right intrarenal calculus.    BLADDER: Within normal limits.  REPRODUCTIVE ORGANS: Enlarged prostate with calcifications.    BOWEL: No bowel obstruction. Appendix is normal. Few scattered colonic   diverticula.  PERITONEUM: No ascites.  VESSELS: Atherosclerotic changes.  RETROPERITONEUM/LYMPH NODES: No lymphadenopathy.  ABDOMINAL WALL: Tiny fat-containing umbilical hernia.  BONES: Degenerative changes.    IMPRESSION:  Left-sided ureteral stent in place. Very mild left hydroureteronephrosis.   Mild left perinephric fat stranding. Suggestion of minimal enhancement   along left renal pelvis and proximal ureter. Correlate clinically for   infection. Comparison with prior imaging could be helpful.        --- End of Report ---            CAROLYN GOMES MD; Attending Radiologist  This document has been electronically signed. 2023  7:08AM    < end of copied text >

## 2023-01-20 DIAGNOSIS — N20.0 CALCULUS OF KIDNEY: ICD-10-CM

## 2023-01-20 DIAGNOSIS — N12 TUBULO-INTERSTITIAL NEPHRITIS, NOT SPECIFIED AS ACUTE OR CHRONIC: ICD-10-CM

## 2023-01-20 LAB
A1C WITH ESTIMATED AVERAGE GLUCOSE RESULT: 7.2 % — HIGH (ref 4–5.6)
ANION GAP SERPL CALC-SCNC: 8 MMOL/L — SIGNIFICANT CHANGE UP (ref 5–17)
BUN SERPL-MCNC: 21 MG/DL — SIGNIFICANT CHANGE UP (ref 7–23)
CALCIUM SERPL-MCNC: 8.7 MG/DL — SIGNIFICANT CHANGE UP (ref 8.5–10.1)
CHLORIDE SERPL-SCNC: 106 MMOL/L — SIGNIFICANT CHANGE UP (ref 96–108)
CO2 SERPL-SCNC: 24 MMOL/L — SIGNIFICANT CHANGE UP (ref 22–31)
CREAT SERPL-MCNC: 1.12 MG/DL — SIGNIFICANT CHANGE UP (ref 0.5–1.3)
EGFR: 73 ML/MIN/1.73M2 — SIGNIFICANT CHANGE UP
ESTIMATED AVERAGE GLUCOSE: 160 MG/DL — HIGH (ref 68–114)
GLUCOSE BLDC GLUCOMTR-MCNC: 145 MG/DL — HIGH (ref 70–99)
GLUCOSE BLDC GLUCOMTR-MCNC: 146 MG/DL — HIGH (ref 70–99)
GLUCOSE BLDC GLUCOMTR-MCNC: 150 MG/DL — HIGH (ref 70–99)
GLUCOSE SERPL-MCNC: 115 MG/DL — HIGH (ref 70–99)
HCT VFR BLD CALC: 34.3 % — LOW (ref 39–50)
HCV AB S/CO SERPL IA: 0.1 S/CO — SIGNIFICANT CHANGE UP (ref 0–0.99)
HCV AB SERPL-IMP: SIGNIFICANT CHANGE UP
HGB BLD-MCNC: 11.5 G/DL — LOW (ref 13–17)
MCHC RBC-ENTMCNC: 31.6 PG — SIGNIFICANT CHANGE UP (ref 27–34)
MCHC RBC-ENTMCNC: 33.5 GM/DL — SIGNIFICANT CHANGE UP (ref 32–36)
MCV RBC AUTO: 94.2 FL — SIGNIFICANT CHANGE UP (ref 80–100)
NIDUS STONE QN: SIGNIFICANT CHANGE UP
PLATELET # BLD AUTO: 223 K/UL — SIGNIFICANT CHANGE UP (ref 150–400)
POTASSIUM SERPL-MCNC: 3.7 MMOL/L — SIGNIFICANT CHANGE UP (ref 3.5–5.3)
POTASSIUM SERPL-SCNC: 3.7 MMOL/L — SIGNIFICANT CHANGE UP (ref 3.5–5.3)
RBC # BLD: 3.64 M/UL — LOW (ref 4.2–5.8)
RBC # FLD: 11.9 % — SIGNIFICANT CHANGE UP (ref 10.3–14.5)
SODIUM SERPL-SCNC: 138 MMOL/L — SIGNIFICANT CHANGE UP (ref 135–145)
WBC # BLD: 17.17 K/UL — HIGH (ref 3.8–10.5)
WBC # FLD AUTO: 17.17 K/UL — HIGH (ref 3.8–10.5)

## 2023-01-20 PROCEDURE — 99232 SBSQ HOSP IP/OBS MODERATE 35: CPT

## 2023-01-20 RX ORDER — POLYETHYLENE GLYCOL 3350 17 G/17G
17 POWDER, FOR SOLUTION ORAL AT BEDTIME
Refills: 0 | Status: DISCONTINUED | OUTPATIENT
Start: 2023-01-20 | End: 2023-01-22

## 2023-01-20 RX ORDER — HYDROCORTISONE 1 %
1 OINTMENT (GRAM) TOPICAL THREE TIMES A DAY
Refills: 0 | Status: DISCONTINUED | OUTPATIENT
Start: 2023-01-20 | End: 2023-01-22

## 2023-01-20 RX ORDER — AMPICILLIN SODIUM AND SULBACTAM SODIUM 250; 125 MG/ML; MG/ML
INJECTION, POWDER, FOR SUSPENSION INTRAMUSCULAR; INTRAVENOUS
Refills: 0 | Status: DISCONTINUED | OUTPATIENT
Start: 2023-01-20 | End: 2023-01-22

## 2023-01-20 RX ORDER — AMPICILLIN SODIUM AND SULBACTAM SODIUM 250; 125 MG/ML; MG/ML
3 INJECTION, POWDER, FOR SUSPENSION INTRAMUSCULAR; INTRAVENOUS ONCE
Refills: 0 | Status: COMPLETED | OUTPATIENT
Start: 2023-01-20 | End: 2023-01-20

## 2023-01-20 RX ORDER — AMPICILLIN SODIUM AND SULBACTAM SODIUM 250; 125 MG/ML; MG/ML
INJECTION, POWDER, FOR SUSPENSION INTRAMUSCULAR; INTRAVENOUS
Refills: 0 | Status: DISCONTINUED | OUTPATIENT
Start: 2023-01-20 | End: 2023-01-20

## 2023-01-20 RX ORDER — AMPICILLIN SODIUM AND SULBACTAM SODIUM 250; 125 MG/ML; MG/ML
3 INJECTION, POWDER, FOR SUSPENSION INTRAMUSCULAR; INTRAVENOUS EVERY 6 HOURS
Refills: 0 | Status: DISCONTINUED | OUTPATIENT
Start: 2023-01-21 | End: 2023-01-22

## 2023-01-20 RX ORDER — AMPICILLIN SODIUM AND SULBACTAM SODIUM 250; 125 MG/ML; MG/ML
1.5 INJECTION, POWDER, FOR SUSPENSION INTRAMUSCULAR; INTRAVENOUS ONCE
Refills: 0 | Status: DISCONTINUED | OUTPATIENT
Start: 2023-01-20 | End: 2023-01-20

## 2023-01-20 RX ADMIN — SODIUM CHLORIDE 100 MILLILITER(S): 9 INJECTION INTRAMUSCULAR; INTRAVENOUS; SUBCUTANEOUS at 17:12

## 2023-01-20 RX ADMIN — CEFTRIAXONE 2000 MILLIGRAM(S): 500 INJECTION, POWDER, FOR SOLUTION INTRAMUSCULAR; INTRAVENOUS at 10:08

## 2023-01-20 RX ADMIN — TAMSULOSIN HYDROCHLORIDE 0.4 MILLIGRAM(S): 0.4 CAPSULE ORAL at 10:08

## 2023-01-20 RX ADMIN — Medication 650 MILLIGRAM(S): at 04:16

## 2023-01-20 RX ADMIN — Medication 650 MILLIGRAM(S): at 23:00

## 2023-01-20 RX ADMIN — ENOXAPARIN SODIUM 40 MILLIGRAM(S): 100 INJECTION SUBCUTANEOUS at 12:55

## 2023-01-20 RX ADMIN — SODIUM CHLORIDE 100 MILLILITER(S): 9 INJECTION INTRAMUSCULAR; INTRAVENOUS; SUBCUTANEOUS at 06:04

## 2023-01-20 RX ADMIN — AMPICILLIN SODIUM AND SULBACTAM SODIUM 200 GRAM(S): 250; 125 INJECTION, POWDER, FOR SUSPENSION INTRAMUSCULAR; INTRAVENOUS at 18:47

## 2023-01-20 RX ADMIN — Medication 650 MILLIGRAM(S): at 06:02

## 2023-01-20 RX ADMIN — Medication 650 MILLIGRAM(S): at 22:06

## 2023-01-20 RX ADMIN — AMPICILLIN SODIUM AND SULBACTAM SODIUM 200 GRAM(S): 250; 125 INJECTION, POWDER, FOR SUSPENSION INTRAMUSCULAR; INTRAVENOUS at 23:54

## 2023-01-20 RX ADMIN — Medication 81 MILLIGRAM(S): at 12:55

## 2023-01-20 RX ADMIN — LOSARTAN POTASSIUM 50 MILLIGRAM(S): 100 TABLET, FILM COATED ORAL at 22:07

## 2023-01-21 LAB
ANION GAP SERPL CALC-SCNC: 9 MMOL/L — SIGNIFICANT CHANGE UP (ref 5–17)
BASOPHILS # BLD AUTO: 0.03 K/UL — SIGNIFICANT CHANGE UP (ref 0–0.2)
BASOPHILS NFR BLD AUTO: 0.3 % — SIGNIFICANT CHANGE UP (ref 0–2)
BUN SERPL-MCNC: 20 MG/DL — SIGNIFICANT CHANGE UP (ref 7–23)
CALCIUM SERPL-MCNC: 8.4 MG/DL — LOW (ref 8.5–10.1)
CHLORIDE SERPL-SCNC: 110 MMOL/L — HIGH (ref 96–108)
CO2 SERPL-SCNC: 22 MMOL/L — SIGNIFICANT CHANGE UP (ref 22–31)
CREAT SERPL-MCNC: 0.94 MG/DL — SIGNIFICANT CHANGE UP (ref 0.5–1.3)
EGFR: 91 ML/MIN/1.73M2 — SIGNIFICANT CHANGE UP
EOSINOPHIL # BLD AUTO: 0.21 K/UL — SIGNIFICANT CHANGE UP (ref 0–0.5)
EOSINOPHIL NFR BLD AUTO: 2.1 % — SIGNIFICANT CHANGE UP (ref 0–6)
GLUCOSE SERPL-MCNC: 136 MG/DL — HIGH (ref 70–99)
HCT VFR BLD CALC: 32 % — LOW (ref 39–50)
HGB BLD-MCNC: 10.7 G/DL — LOW (ref 13–17)
IMM GRANULOCYTES NFR BLD AUTO: 0.6 % — SIGNIFICANT CHANGE UP (ref 0–0.9)
LYMPHOCYTES # BLD AUTO: 1.43 K/UL — SIGNIFICANT CHANGE UP (ref 1–3.3)
LYMPHOCYTES # BLD AUTO: 14 % — SIGNIFICANT CHANGE UP (ref 13–44)
MAGNESIUM SERPL-MCNC: 1.8 MG/DL — SIGNIFICANT CHANGE UP (ref 1.6–2.6)
MCHC RBC-ENTMCNC: 31.7 PG — SIGNIFICANT CHANGE UP (ref 27–34)
MCHC RBC-ENTMCNC: 33.4 GM/DL — SIGNIFICANT CHANGE UP (ref 32–36)
MCV RBC AUTO: 94.7 FL — SIGNIFICANT CHANGE UP (ref 80–100)
MONOCYTES # BLD AUTO: 1.15 K/UL — HIGH (ref 0–0.9)
MONOCYTES NFR BLD AUTO: 11.2 % — SIGNIFICANT CHANGE UP (ref 2–14)
NEUTROPHILS # BLD AUTO: 7.36 K/UL — SIGNIFICANT CHANGE UP (ref 1.8–7.4)
NEUTROPHILS NFR BLD AUTO: 71.8 % — SIGNIFICANT CHANGE UP (ref 43–77)
PLATELET # BLD AUTO: 229 K/UL — SIGNIFICANT CHANGE UP (ref 150–400)
POTASSIUM SERPL-MCNC: 3.8 MMOL/L — SIGNIFICANT CHANGE UP (ref 3.5–5.3)
POTASSIUM SERPL-SCNC: 3.8 MMOL/L — SIGNIFICANT CHANGE UP (ref 3.5–5.3)
RBC # BLD: 3.38 M/UL — LOW (ref 4.2–5.8)
RBC # FLD: 11.8 % — SIGNIFICANT CHANGE UP (ref 10.3–14.5)
SODIUM SERPL-SCNC: 141 MMOL/L — SIGNIFICANT CHANGE UP (ref 135–145)
WBC # BLD: 10.24 K/UL — SIGNIFICANT CHANGE UP (ref 3.8–10.5)
WBC # FLD AUTO: 10.24 K/UL — SIGNIFICANT CHANGE UP (ref 3.8–10.5)

## 2023-01-21 PROCEDURE — 99232 SBSQ HOSP IP/OBS MODERATE 35: CPT

## 2023-01-21 RX ADMIN — AMPICILLIN SODIUM AND SULBACTAM SODIUM 200 GRAM(S): 250; 125 INJECTION, POWDER, FOR SUSPENSION INTRAMUSCULAR; INTRAVENOUS at 11:34

## 2023-01-21 RX ADMIN — Medication 81 MILLIGRAM(S): at 11:34

## 2023-01-21 RX ADMIN — AMPICILLIN SODIUM AND SULBACTAM SODIUM 200 GRAM(S): 250; 125 INJECTION, POWDER, FOR SUSPENSION INTRAMUSCULAR; INTRAVENOUS at 05:59

## 2023-01-21 RX ADMIN — AMPICILLIN SODIUM AND SULBACTAM SODIUM 200 GRAM(S): 250; 125 INJECTION, POWDER, FOR SUSPENSION INTRAMUSCULAR; INTRAVENOUS at 18:30

## 2023-01-21 RX ADMIN — SODIUM CHLORIDE 100 MILLILITER(S): 9 INJECTION INTRAMUSCULAR; INTRAVENOUS; SUBCUTANEOUS at 02:28

## 2023-01-21 RX ADMIN — SODIUM CHLORIDE 100 MILLILITER(S): 9 INJECTION INTRAMUSCULAR; INTRAVENOUS; SUBCUTANEOUS at 11:37

## 2023-01-21 RX ADMIN — TAMSULOSIN HYDROCHLORIDE 0.4 MILLIGRAM(S): 0.4 CAPSULE ORAL at 08:23

## 2023-01-21 RX ADMIN — LOSARTAN POTASSIUM 50 MILLIGRAM(S): 100 TABLET, FILM COATED ORAL at 21:37

## 2023-01-21 NOTE — PROGRESS NOTE ADULT - SUBJECTIVE AND OBJECTIVE BOX
CHIEF COMPLAINT: 62/M with PMHX of DM 2, HTN, Renal stones s/p lithotripsy for stone on the left with ureteral stent placed by Dr. Layton on 1/12/23; came to ED for c/o  fever and shaking chills.  Patient states that he called Dr. Layton yesterday and was sent to the lab to have his urine checked for infection and was started on an antibiotic.  He was only able to take 1 dose and last night symptoms worsened and he came to the ED. At home fevers ranging between 100 and 103.   CT abdo showed Left Pyelonephritis.     1/20 - flank pain better; no cp palps sob abdo pain     Vital Signs Last 24 Hrs  T(C): 37.5 (20 Jan 2023 15:49), Max: 38.8 (19 Jan 2023 22:04)  T(F): 99.5 (20 Jan 2023 15:49), Max: 101.8 (19 Jan 2023 22:04)  HR: 78 (20 Jan 2023 15:49) (78 - 102)  BP: 107/62 (20 Jan 2023 15:49) (102/60 - 115/67)  RR: 18 (20 Jan 2023 15:49) (17 - 18)  SpO2: 98% (20 Jan 2023 15:49) (96% - 100%)  Parameters below as of 20 Jan 2023 15:49  Patient On (Oxygen Delivery Method): room air  Constitutional: NAD, awake and alert  HEENT: PERR, EOMI  Neck: Soft and supple,  No JVD  Respiratory: Breath sounds are clear bilaterally, No wheezing, rales or rhonchi  Cardiovascular: S1 and S2, regular rate and rhythm, no Murmurs  Gastrointestinal: Bowel Sounds present, soft, nontender, nondistended  Extremities: No peripheral edema  Vascular: 2+ peripheral pulses  Neurological: A/O x 3, no focal deficits  Musculoskeletal: 5/5 strength b/l upper and lower extremities  Skin: No rashes    MEDICATIONS:  MEDICATIONS  (STANDING):  ampicillin/sulbactam  IVPB      aspirin  chewable 81 milliGRAM(s) Oral daily  dextrose 5%. 1000 milliLiter(s) (100 mL/Hr) IV Continuous <Continuous>  dextrose 5%. 1000 milliLiter(s) (50 mL/Hr) IV Continuous <Continuous>  dextrose 50% Injectable 25 Gram(s) IV Push once  dextrose 50% Injectable 12.5 Gram(s) IV Push once  dextrose 50% Injectable 25 Gram(s) IV Push once  enoxaparin Injectable 40 milliGRAM(s) SubCutaneous every 24 hours  glucagon  Injectable 1 milliGRAM(s) IntraMuscular once  insulin lispro (ADMELOG) corrective regimen sliding scale   SubCutaneous three times a day before meals  insulin lispro (ADMELOG) corrective regimen sliding scale   SubCutaneous at bedtime  losartan 50 milliGRAM(s) Oral daily  sodium chloride 0.9%. 1000 milliLiter(s) (100 mL/Hr) IV Continuous <Continuous>  tamsulosin 0.4 milliGRAM(s) Oral at bedtime      LABS: All Labs Reviewed:                        11.5   17.17 )-----------( 223      ( 20 Jan 2023 07:06 )             34.3   138  |  106  |  21  ----------------------------<  115<H>  3.7   |  24  |  1.12  Ca    8.7      20 Jan 2023 07:06  TPro  7.8  /  Alb  3.6  /  TBili  0.8  /  DBili  x   /  AST  13<L>  /  ALT  24  /  AlkPhos  44  01-19  PT/INR - ( 19 Jan 2023 04:20 )   PT: 13.3 sec;   INR: 1.14 ratio    PTT - ( 19 Jan 2023 04:20 )  PTT:29.8 sec    RADIOLOGY/EKG:  < from: CT Abdomen and Pelvis w/ IV Cont (01.19.23 @ 06:54) >  Left-sided ureteral stent in place. Very mild left hydroureteronephrosis.   Mild left perinephric fat stranding. Suggestion of minimal enhancement   along left renal pelvis and proximal ureter. Correlate clinically for   infection. Comparison with prior imaging could be helpful.      
  Patient is a 62y old  Male who presents with a chief complaint of Left Pyelonephritis (2023 16:49)      Vital Signs Last 24 Hrs  T(C): 37.9 (2023 04:18), Max: 38.8 (2023 22:04)  T(F): 100.2 (2023 04:18), Max: 101.8 (2023 22:04)  HR: 92 (2023 04:18) (87 - 102)  BP: 111/62 (2023 04:18) (98/58 - 111/62)  BP(mean): --  RR: 18 (2023 04:18) (18 - 18)  SpO2: 98% (2023 04:18) (96% - 100%)    Parameters below as of 2023 04:18  Patient On (Oxygen Delivery Method): room air        LABS:                        11.5   17.17 )-----------( 223      ( 2023 07:06 )             34.3         138  |  106  |  21  ----------------------------<  115<H>  3.7   |  24  |  1.12    Ca    8.7      2023 07:06    TPro  7.8  /  Alb  3.6  /  TBili  0.8  /  DBili  x   /  AST  13<L>  /  ALT  24  /  AlkPhos  44      PT/INR - ( 2023 04:20 )   PT: 13.3 sec;   INR: 1.14 ratio         PTT - ( 2023 04:20 )  PTT:29.8 sec  Urinalysis Basic - ( 2023 04:20 )    Color: Yellow / Appearance: Clear / S.015 / pH: x  Gluc: x / Ketone: Moderate  / Bili: Negative / Urobili: Negative   Blood: x / Protein: 15 / Nitrite: Negative   Leuk Esterase: Moderate / RBC: >50 /HPF / WBC >50 /HPF   Sq Epi: x / Non Sq Epi: Negative / Bacteria: Moderate  AAO x 3   Normal respiratory effort  Normal peripheral circulation             Culture Results:   No growth to date. (23 @ 04:20)  Culture Results:   No growth to date. (23 @ 04:20)  
CHIEF COMPLAINT: 62/M with PMHX of DM 2, HTN, Renal stones s/p lithotripsy for stone on the left with ureteral stent placed by Dr. Layton on 1/12/23; came to ED for c/o  fever and shaking chills.  Patient states that he called Dr. Layton yesterday and was sent to the lab to have his urine checked for infection and was started on an antibiotic.  He was only able to take 1 dose and last night symptoms worsened and he came to the ED. At home fevers ranging between 100 and 103.   CT abdo showed Left Pyelonephritis.     1/21 - flank pain better; no cp palps sob abdo pain, ambulating in hallway     Vital Signs Last 24 Hrs  T(C): 37.3 (21 Jan 2023 15:34), Max: 37.4 (21 Jan 2023 06:06)  T(F): 99.2 (21 Jan 2023 15:34), Max: 99.4 (21 Jan 2023 06:53)  HR: 87 (21 Jan 2023 15:34) (74 - 92)  BP: 124/68 (21 Jan 2023 15:34) (123/62 - 124/68)  RR: 18 (21 Jan 2023 15:34) (17 - 18)  SpO2: 99% (21 Jan 2023 15:34) (97% - 99%)/RA   Constitutional: NAD, awake and alert  HEENT: PERR, EOMI  Neck: Soft and supple,  No JVD  Respiratory: Breath sounds are clear bilaterally, No wheezing, rales or rhonchi  Cardiovascular: S1 and S2, regular rate and rhythm, no Murmurs  Gastrointestinal: Bowel Sounds present, soft, nontender, nondistended  Extremities: No peripheral edema  Vascular: 2+ peripheral pulses  Neurological: A/O x 3, no focal deficits  Musculoskeletal: 5/5 strength b/l upper and lower extremities  Skin: No rashes      RADIOLOGY/EKG:  < from: CT Abdomen and Pelvis w/ IV Cont (01.19.23 @ 06:54) >  Left-sided ureteral stent in place. Very mild left hydroureteronephrosis.   Mild left perinephric fat stranding. Suggestion of minimal enhancement   along left renal pelvis and proximal ureter. Correlate clinically for   infection. Comparison with prior imaging could be helpful.    LABS: All Labs Reviewed:                        10.7   10.24 )-----------( 229      ( 21 Jan 2023 07:20 )             32.0     01-21    141  |  110<H>  |  20  ----------------------------<  136<H>  3.8   |  22  |  0.94    Ca    8.4<L>      21 Jan 2023 07:20  Mg     1.8     01-21        MEDS:   acetaminophen     Tablet .. 650 milliGRAM(s) Oral every 6 hours PRN  aluminum hydroxide/magnesium hydroxide/simethicone Suspension 30 milliLiter(s) Oral every 4 hours PRN  ampicillin/sulbactam  IVPB      ampicillin/sulbactam  IVPB 3 Gram(s) IV Intermittent every 6 hours  aspirin  chewable 81 milliGRAM(s) Oral daily  dextrose 5%. 1000 milliLiter(s) IV Continuous <Continuous>  dextrose 5%. 1000 milliLiter(s) IV Continuous <Continuous>  dextrose 50% Injectable 25 Gram(s) IV Push once  dextrose 50% Injectable 12.5 Gram(s) IV Push once  dextrose 50% Injectable 25 Gram(s) IV Push once  dextrose Oral Gel 15 Gram(s) Oral once PRN  enoxaparin Injectable 40 milliGRAM(s) SubCutaneous every 24 hours  glucagon  Injectable 1 milliGRAM(s) IntraMuscular once  hydrocortisone hemorrhoidal Suppository 1 Suppository(s) Rectal three times a day PRN  insulin lispro (ADMELOG) corrective regimen sliding scale   SubCutaneous three times a day before meals  insulin lispro (ADMELOG) corrective regimen sliding scale   SubCutaneous at bedtime  losartan 50 milliGRAM(s) Oral daily  melatonin 3 milliGRAM(s) Oral at bedtime PRN  ondansetron Injectable 4 milliGRAM(s) IV Push every 8 hours PRN  polyethylene glycol 3350 17 Gram(s) Oral at bedtime  senna 2 Tablet(s) Oral at bedtime PRN  tamsulosin 0.4 milliGRAM(s) Oral at bedtime

## 2023-01-21 NOTE — PROGRESS NOTE ADULT - ASSESSMENT
62/M with PMHX of DM 2, HTN, Renal stones s/p lithotripsy for stone on the left with ureteral stent placed by Dr. Layton on 1/12/23; came to ED for c/o  fever and shaking chills.  Patient states that he called Dr. Layton yesterday and was sent to the lab to have his urine checked for infection and was started on an antibiotic.  He was only able to take 1 dose and last night symptoms worsened and he came to the ED. At home fevers ranging between 100 and 103.   CT abdo showed Left Pyelonephritis.     Pt admitted with :    Fever  Leukocytosis, 21 k  UTI  Sepsis; present on admission  Left Pyelonephritis  Left ureteral stent  done on 1/1/2/23  DM2   HTN  Elevated Cr 1.47; improving since last value of 1.82 0n 1/9/23    PLAN:   iv fluids - dc   iv ceftriaxone 2 g daily changed to IV unasyn per preliminary culture report   UCX - GP organisms  FS +ISS    DVT PPX: lovenox    POC discussed with pt, family,  team ,   URO - Dr. Layton.   DC planning   
62/M with PMHX of DM 2, HTN, Renal stones s/p lithotripsy for stone on the left with ureteral stent placed by Dr. Layton on 1/12/23; came to ED for c/o  fever and shaking chills.  Patient states that he called Dr. Layton yesterday and was sent to the lab to have his urine checked for infection and was started on an antibiotic.  He was only able to take 1 dose and last night symptoms worsened and he came to the ED. At home fevers ranging between 100 and 103.   CT abdo showed Left Pyelonephritis.     Pt admitted with :    Fever  Leukocytosis, 21 k  UTI  Sepsis; present on admission  Left Pyelonephritis  Left ureteral stent  done on 1/1/2/23  DM2   HTN  Elevated Cr 1.47; improving since last value of 1.82 0n 1/9/23    PLAN:   iv fluids  iv ceftriaxone 2 g daily changed to IV unasyn per preliminary culture report   f/u blood/urine cx here   FS +ISS  CBC, BMP in am    DVT PPX: lovenox    POC discussed with pt, family,  team , Dr. Layton.   
Still having left flank pain and fevers.   No difficulty urinating.   Passed some stone particles yesterday.   Had called office a day before coming into the Hospital.   Did out patient urine test and took antibiotics.     Continue antibiotics, will follow cultures and treat per sensitivity.   Patient scheduled for 2nd stage Ureteroscopy on 1/30.   Will monitor Hospital stay.

## 2023-01-22 ENCOUNTER — TRANSCRIPTION ENCOUNTER (OUTPATIENT)
Age: 63
End: 2023-01-22

## 2023-01-22 VITALS
TEMPERATURE: 99 F | SYSTOLIC BLOOD PRESSURE: 127 MMHG | OXYGEN SATURATION: 100 % | RESPIRATION RATE: 18 BRPM | DIASTOLIC BLOOD PRESSURE: 65 MMHG | HEART RATE: 81 BPM

## 2023-01-22 PROCEDURE — 99239 HOSP IP/OBS DSCHRG MGMT >30: CPT

## 2023-01-22 RX ORDER — POTASSIUM CITRATE MONOHYDRATE 100 %
1 POWDER (GRAM) MISCELLANEOUS
Qty: 30 | Refills: 0
Start: 2023-01-22 | End: 2023-02-20

## 2023-01-22 RX ORDER — CYCLOBENZAPRINE HYDROCHLORIDE 10 MG/1
1 TABLET, FILM COATED ORAL
Qty: 0 | Refills: 0 | DISCHARGE

## 2023-01-22 RX ADMIN — TAMSULOSIN HYDROCHLORIDE 0.4 MILLIGRAM(S): 0.4 CAPSULE ORAL at 08:59

## 2023-01-22 RX ADMIN — AMPICILLIN SODIUM AND SULBACTAM SODIUM 200 GRAM(S): 250; 125 INJECTION, POWDER, FOR SUSPENSION INTRAMUSCULAR; INTRAVENOUS at 11:50

## 2023-01-22 RX ADMIN — AMPICILLIN SODIUM AND SULBACTAM SODIUM 200 GRAM(S): 250; 125 INJECTION, POWDER, FOR SUSPENSION INTRAMUSCULAR; INTRAVENOUS at 05:24

## 2023-01-22 RX ADMIN — Medication 81 MILLIGRAM(S): at 11:49

## 2023-01-22 RX ADMIN — Medication 30 MILLILITER(S): at 05:24

## 2023-01-22 RX ADMIN — AMPICILLIN SODIUM AND SULBACTAM SODIUM 200 GRAM(S): 250; 125 INJECTION, POWDER, FOR SUSPENSION INTRAMUSCULAR; INTRAVENOUS at 00:08

## 2023-01-22 RX ADMIN — Medication 3 MILLIGRAM(S): at 00:08

## 2023-01-22 NOTE — DISCHARGE NOTE PROVIDER - NSDCFUSCHEDAPPT_GEN_ALL_CORE_FT
Rahat Layton  [facility]  HNT PreAdmits  Scheduled Appointment: 01/30/2023    Rahat Layton  [facility]  UROLOGY [place] [address]   Scheduled Appointment: 01/30/2023

## 2023-01-22 NOTE — DISCHARGE NOTE NURSING/CASE MANAGEMENT/SOCIAL WORK - PATIENT PORTAL LINK FT
You can access the FollowMyHealth Patient Portal offered by [facility] by registering at the following website: [email or web address]. By joining [facility]’s FollowMyHealth portal, you will also be able to view your health information using other applications (apps) compatible with our system.

## 2023-01-22 NOTE — DISCHARGE NOTE PROVIDER - NSDCMRMEDTOKEN_GEN_ALL_CORE_FT
amoxicillin-clavulanate 875 mg-125 mg oral tablet: 1  orally every 12 hours   aspirin 81 mg oral tablet: 1 tab(s) orally once a day  baclofen 10 mg oral tablet: 1 tab(s) orally once a day, As Needed  Jardiance 25 mg oral tablet: 1 tab(s) orally once a day (in the morning)  olmesartan 20 mg oral tablet: 1 tab(s) orally once a day  potassium citrate 15 mEq oral tablet, extended release: 1 tab(s) orally once a day   Praluent Pen 75 mg/mL subcutaneous solution: 1 dose(s) subcutaneous every 2 weeks  Pyridium 100 mg oral tablet: 1 tab(s) orally 3 times a day (after meals)   tamsulosin 0.4 mg oral capsule: 1 cap(s) orally once a day

## 2023-01-22 NOTE — DISCHARGE NOTE PROVIDER - NSDCCPCAREPLAN_GEN_ALL_CORE_FT
PRINCIPAL DISCHARGE DIAGNOSIS  Diagnosis: Pyelonephritis  Assessment and Plan of Treatment: please complete Augmentin as prescribed.  low dose of potassium citrate added to alkalinize the urine  check blood work ( Chem 7) in a week to monitor potassium levels.  follow-up with Dr Layton for final results of culture

## 2023-01-22 NOTE — DISCHARGE NOTE PROVIDER - HOSPITAL COURSE
CHIEF COMPLAINT: 62/M with PMHX of DM 2, HTN, Renal stones s/p lithotripsy for stone on the left with ureteral stent placed by Dr. Layton on 1/12/23; came to ED for c/o  fever and shaking chills.  Patient states that he called Dr. Latyon yesterday and was sent to the lab to have his urine checked for infection and was started on an antibiotic.  He was only able to take 1 dose and last night symptoms worsened and he came to the ED. At home fevers ranging between 100 and 103.   CT abdo showed Left Pyelonephritis.     Fever  Leukocytosis, 21 k  UTI  Sepsis; present on admission  Left Pyelonephritis  Left ureteral stent  done on 1/1/2/23  DM2   HTN  Elevated Cr 1.47; improving since last value of 1.82 0n 1/9/23    PLAN:   dc IVFs, dc IV Unasyn  change to PO augmentin for 10days   f/u blood/urine cx here - as OP - pt stable for discharge   FS +IS  previuosly stone studies with uric acid stone, Urine pH is 5 - will start on KCitrate tab once a day to alkalinize the urine     DVT PPX: lovenox    POC discussed with pt, family,  team , Dr. De Santiago who is covering for Dr Layton     OTHER DETAILS:   1/22 - no cp palps sob abdo pain     PHYSICAL EXAM:  Vital Signs Last 24 Hrs  T(C): 36.9 (22 Jan 2023 08:41), Max: 37.3 (21 Jan 2023 15:34)  T(F): 98.5 (22 Jan 2023 08:41), Max: 99.2 (21 Jan 2023 15:34)  HR: 83 (22 Jan 2023 08:41) (83 - 90)  BP: 131/75 (22 Jan 2023 08:41) (116/72 - 131/75)  RR: 17 (22 Jan 2023 08:41) (17 - 18)  SpO2: 99% (22 Jan 2023 08:41) (99% - 99%)  Parameters below as of 22 Jan 2023 08:41  Patient On (Oxygen Delivery Method): room air  GENERAL: NAD, able to lie flat in bed  HEAD:  Atraumatic, Normocephalic  EYES: EOMI, PERRLA, normal sclera  ENT: Moist mucous membranes  NECK: Supple, No JVD, no nuchal rigidity  CHEST/LUNG: Clear to auscultation bilaterally; No rales, rhonchi, wheezing, or rubs. Unlabored respirations  HEART: Regular rate and rhythm; No murmurs, rubs, or gallops  ABDOMEN: Bowel sounds present; Soft, Nontender, Nondistended. No hepatomegaly  EXTREMITIES:  no pitting bilaterally  NERVOUS SYSTEM:  Alert & Oriented X3, speech clear. No focal motor or sensory deficits  MSK: FROM all 4 extremities, full and equal strength  SKIN: No rashes or lesions    LABS: All Labs Reviewed:                        10.7   10.24 )-----------( 229      ( 21 Jan 2023 07:20 )             32.0    141  |  110<H>  |  20  ----------------------------<  136<H>  3.8   |  22  |  0.94  Ca    8.4<L>      21 Jan 2023 07:20  Mg     1.8     01-21    RAD:  < from: CT Abdomen and Pelvis w/ IV Cont (01.19.23 @ 06:54) >  Left-sided ureteral stent in place. Very mild left hydroureteronephrosis.   Mild left perinephric fat stranding. Suggestion of minimal enhancement   along left renal pelvis and proximal ureter. Correlate clinically for   infection. Comparison with prior imaging could be helpful.    time spent on discharge - 50 mins

## 2023-01-22 NOTE — DISCHARGE NOTE PROVIDER - CARE PROVIDER_API CALL
Rahat Layton)  Urology  [address], [address] [place] [address]  Phone: [phone]  Fax: [phone]  Follow Up Time: 1 week

## 2023-01-24 ENCOUNTER — APPOINTMENT (OUTPATIENT)
Dept: UROLOGY | Facility: CLINIC | Age: 63
End: 2023-01-24

## 2023-01-24 ENCOUNTER — NON-APPOINTMENT (OUTPATIENT)
Age: 63
End: 2023-01-24

## 2023-01-24 ENCOUNTER — APPOINTMENT (OUTPATIENT)
Dept: UROLOGY | Facility: CLINIC | Age: 63
End: 2023-01-24
Payer: COMMERCIAL

## 2023-01-24 DIAGNOSIS — N12 TUBULO-INTERSTITIAL NEPHRITIS, NOT SPECIFIED AS ACUTE OR CHRONIC: ICD-10-CM

## 2023-01-24 LAB
BACTERIA UR CULT: ABNORMAL
CULTURE RESULTS: SIGNIFICANT CHANGE UP
CULTURE RESULTS: SIGNIFICANT CHANGE UP
SPECIMEN SOURCE: SIGNIFICANT CHANGE UP
SPECIMEN SOURCE: SIGNIFICANT CHANGE UP

## 2023-01-24 PROCEDURE — 99214 OFFICE O/P EST MOD 30 MIN: CPT

## 2023-01-24 NOTE — ASSESSMENT
[FreeTextEntry1] : Reviewed records.\par Discussed labs and imaging. \par Stone analysis(8/17/21): \par 90 % uric acid \par 10 % calcium oxalate \par Reviewed 24 hour urine tests: low volume, variable citrate and low pH\par \par Disorder of calcium metabolism:\par Discussed stone analysis: \par 100 % uric acid. \par Continue Potassium Citrate. \par Recommended Kidney stone prevention diet:\par Good oral hydration so that urine is clear to light yellow, usually 1.5 to 2 Liters of fluids, mainly water\par Increasing Citrate in diet by consuming citrus fruits and juices- bob, limes, oranges, grapefruits and berries \par Less red meat\par Less salt\par Limit foods with oxalate like- dark green vegetables, rhubarb, chocolate, wheat bran, nuts, cranberries, and beans\par Will get Kidney stone metabolic work up- Ca, PTH, Uric acid and 24 Hr urine kidney stone risk profile at later date. \par  \par Kidney stone:\par Discussed treatment options for residual 1.6 cm x 0.8 cm left Kidney stone. Will like to proceed with ureteroscopy. \par Will reschedule 2nd stage ureteroscopy to 2/13/23. \par \par Pyelonephritis:\par Continue Antibiotics. \par Recommended repeat Urine test 1 week after completion of Antibiotics course. \par \par \par

## 2023-01-24 NOTE — HISTORY OF PRESENT ILLNESS
[FreeTextEntry1] : 62 year old male presents for follow up. \par Was admitted at [facility] after ureteroscopy for Pyelonephritis. \par On Augmentin. No longer has fever, still has on and off pain/discomfort along left side. \par Also started on Potassium Citrate from [facility]. \par Taking Flomax. Urinating more frequently. \par Brought in old records. \par Mentions has developed kidney stone only after being diagnosed with Diabetes Mellitus. \par \par Status post left ureteroscopy, laser lithotripsy, stone extraction and ureteral stent placement on 1/12/23 at [facility]. \par \par With Silodosin had painful ejaculation and retrograde ejaculation. \par \par Seen on 8/30/22. Passed a small stone.\par Still had off and on blood tinged urine. Also had off and on back pain. \par Tried Silodosin 4 mg for 4 days had retrograde ejaculation, stopped. \par With Flomax had headache and increased heart rate. \par Also had reported tiredness and dizziness. \par With Alfuzosin in the past had low BP. \par \par Initially seen for kidney stone. \par Had bilateral kidney stones and was to undergo ESWL with Dr Gusman at [facility] and cannot follow with him due to Insurance issues. \par Has history of kidney stones, passed a few, has required ESWL and Ureteroscopy in the past. \par Reported slow and steady stream, urinates 4-5 x or so during the day. Nocturia of 1-2 x. \par Denied hesitancy, straining, intermittency, urgency, incontinence, sense of incomplete emptying.\par Denied dysuria, hematuria, lower abdominal or flank pain, fever, chills or rigors.\par Normal libido and erections. \par No family history of Prostate cancer. \par \par Has tried Flomax in the past, had dizziness. \par \par

## 2023-01-24 NOTE — LETTER BODY
[Dear  ___] : Dear  [unfilled], [Courtesy Letter:] : I had the pleasure of seeing your patient, [unfilled], in my office today. [Please see my note below.] : Please see my note below. [Sincerely,] : Sincerely, [FreeTextEntry3] : Rahat Layton MD\par  of Urology\par [facility]\par \par [facility]\par Offices:\par [address] [facility], [facility]\par [address] [address]\par [address] [facility], [address]\par \par TEL: [phone]\par FAX: [phone]

## 2023-02-01 DIAGNOSIS — I10 ESSENTIAL (PRIMARY) HYPERTENSION: ICD-10-CM

## 2023-02-01 DIAGNOSIS — N13.6 PYONEPHROSIS: ICD-10-CM

## 2023-02-01 DIAGNOSIS — T81.44XA SEPSIS FOLLOWING A PROCEDURE, INITIAL ENCOUNTER: ICD-10-CM

## 2023-02-01 DIAGNOSIS — Z20.822 CONTACT WITH AND (SUSPECTED) EXPOSURE TO COVID-19: ICD-10-CM

## 2023-02-01 DIAGNOSIS — R79.89 OTHER SPECIFIED ABNORMAL FINDINGS OF BLOOD CHEMISTRY: ICD-10-CM

## 2023-02-01 DIAGNOSIS — A41.9 SEPSIS, UNSPECIFIED ORGANISM: ICD-10-CM

## 2023-02-01 DIAGNOSIS — E11.9 TYPE 2 DIABETES MELLITUS WITHOUT COMPLICATIONS: ICD-10-CM

## 2023-02-01 DIAGNOSIS — Z86.16 PERSONAL HISTORY OF COVID-19: ICD-10-CM

## 2023-02-01 DIAGNOSIS — E78.5 HYPERLIPIDEMIA, UNSPECIFIED: ICD-10-CM
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
## 2023-02-01 DIAGNOSIS — Z79.891 LONG TERM (CURRENT) USE OF OPIATE ANALGESIC: ICD-10-CM
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
## 2023-02-01 DIAGNOSIS — Z88.8 ALLERGY STATUS TO OTHER DRUGS, MEDICAMENTS AND BIOLOGICAL SUBSTANCES: ICD-10-CM

## 2023-02-01 DIAGNOSIS — Z79.82 LONG TERM (CURRENT) USE OF ASPIRIN: ICD-10-CM

## 2023-02-07 ENCOUNTER — NON-APPOINTMENT (OUTPATIENT)
Age: 63
End: 2023-02-07

## 2023-02-08 LAB
APPEARANCE: CLEAR
BACTERIA: ABNORMAL
BILIRUBIN URINE: NEGATIVE
BLOOD URINE: ABNORMAL
COLOR: NORMAL
GLUCOSE QUALITATIVE U: ABNORMAL
HYALINE CASTS: 0 /LPF
KETONES URINE: NEGATIVE
LEUKOCYTE ESTERASE URINE: ABNORMAL
MICROSCOPIC-UA: NORMAL
NITRITE URINE: NEGATIVE
PH URINE: 5.5
PROTEIN URINE: NORMAL
RED BLOOD CELLS URINE: 24 /HPF
SPECIFIC GRAVITY URINE: 1.02
SQUAMOUS EPITHELIAL CELLS: 0 /HPF
URINE COMMENTS: NORMAL
UROBILINOGEN URINE: NORMAL
WHITE BLOOD CELLS URINE: 298 /HPF

## 2023-02-08 RX ORDER — CEFUROXIME AXETIL 500 MG/1
500 TABLET ORAL
Qty: 14 | Refills: 0 | Status: COMPLETED | COMMUNITY
Start: 2023-01-18 | End: 2023-02-08

## 2023-02-09 ENCOUNTER — NON-APPOINTMENT (OUTPATIENT)
Age: 63
End: 2023-02-09

## 2023-02-09 LAB — BACTERIA UR CULT: ABNORMAL

## 2023-02-11 LAB — SARS-COV-2 N GENE NPH QL NAA+PROBE: NOT DETECTED

## 2023-02-13 ENCOUNTER — APPOINTMENT (OUTPATIENT)
Dept: UROLOGY | Facility: HOSPITAL | Age: 63
End: 2023-02-13

## 2023-02-13 ENCOUNTER — OUTPATIENT (OUTPATIENT)
Dept: OUTPATIENT SERVICES | Facility: HOSPITAL | Age: 63
LOS: 1 days | End: 2023-02-13
Payer: COMMERCIAL

## 2023-02-13 DIAGNOSIS — N39.0 URINARY TRACT INFECTION, SITE NOT SPECIFIED: ICD-10-CM

## 2023-02-13 DIAGNOSIS — Z98.890 OTHER SPECIFIED POSTPROCEDURAL STATES: Chronic | ICD-10-CM

## 2023-02-13 DIAGNOSIS — N20.0 CALCULUS OF KIDNEY: ICD-10-CM

## 2023-02-13 PROCEDURE — 86850 RBC ANTIBODY SCREEN: CPT

## 2023-02-13 PROCEDURE — 36415 COLL VENOUS BLD VENIPUNCTURE: CPT

## 2023-02-13 PROCEDURE — 86900 BLOOD TYPING SEROLOGIC ABO: CPT

## 2023-02-13 PROCEDURE — 86901 BLOOD TYPING SEROLOGIC RH(D): CPT

## 2023-02-13 PROCEDURE — 84132 ASSAY OF SERUM POTASSIUM: CPT

## 2023-02-14 PROBLEM — B02.9 ZOSTER WITHOUT COMPLICATIONS: Chronic | Status: ACTIVE | Noted: 2023-02-10

## 2023-02-14 PROBLEM — M54.12 RADICULOPATHY, CERVICAL REGION: Chronic | Status: ACTIVE | Noted: 2023-02-10

## 2023-02-14 PROBLEM — Z86.59 PERSONAL HISTORY OF OTHER MENTAL AND BEHAVIORAL DISORDERS: Chronic | Status: ACTIVE | Noted: 2023-02-10

## 2023-02-15 LAB — SARS-COV-2 N GENE NPH QL NAA+PROBE: NOT DETECTED

## 2023-02-16 ENCOUNTER — NON-APPOINTMENT (OUTPATIENT)
Age: 63
End: 2023-02-16

## 2023-02-16 LAB — BACTERIA UR CULT: NORMAL

## 2023-02-17 ENCOUNTER — APPOINTMENT (OUTPATIENT)
Dept: UROLOGY | Facility: HOSPITAL | Age: 63
End: 2023-02-17

## 2023-02-17 ENCOUNTER — TRANSCRIPTION ENCOUNTER (OUTPATIENT)
Age: 63
End: 2023-02-17

## 2023-02-17 ENCOUNTER — RESULT REVIEW (OUTPATIENT)
Age: 63
End: 2023-02-17

## 2023-02-17 ENCOUNTER — OUTPATIENT (OUTPATIENT)
Dept: INPATIENT UNIT | Facility: HOSPITAL | Age: 63
LOS: 1 days | Discharge: HOME | End: 2023-02-17
Payer: COMMERCIAL

## 2023-02-17 VITALS
SYSTOLIC BLOOD PRESSURE: 120 MMHG | TEMPERATURE: 98 F | RESPIRATION RATE: 14 BRPM | WEIGHT: 160.06 LBS | DIASTOLIC BLOOD PRESSURE: 78 MMHG | OXYGEN SATURATION: 100 % | HEART RATE: 72 BPM | HEIGHT: 68 IN

## 2023-02-17 VITALS
TEMPERATURE: 98 F | HEART RATE: 59 BPM | SYSTOLIC BLOOD PRESSURE: 108 MMHG | OXYGEN SATURATION: 100 % | DIASTOLIC BLOOD PRESSURE: 66 MMHG | RESPIRATION RATE: 16 BRPM

## 2023-02-17 DIAGNOSIS — N20.0 CALCULUS OF KIDNEY: ICD-10-CM

## 2023-02-17 DIAGNOSIS — Z98.890 OTHER SPECIFIED POSTPROCEDURAL STATES: Chronic | ICD-10-CM

## 2023-02-17 PROCEDURE — 87116 MYCOBACTERIA CULTURE: CPT

## 2023-02-17 PROCEDURE — 87070 CULTURE OTHR SPECIMN AEROBIC: CPT

## 2023-02-17 PROCEDURE — 87086 URINE CULTURE/COLONY COUNT: CPT

## 2023-02-17 PROCEDURE — 87102 FUNGUS ISOLATION CULTURE: CPT

## 2023-02-17 PROCEDURE — C1769: CPT

## 2023-02-17 PROCEDURE — 76000 FLUOROSCOPY <1 HR PHYS/QHP: CPT

## 2023-02-17 PROCEDURE — 52356 CYSTO/URETERO W/LITHOTRIPSY: CPT | Mod: LT

## 2023-02-17 PROCEDURE — C2617: CPT

## 2023-02-17 PROCEDURE — 82365 CALCULUS SPECTROSCOPY: CPT

## 2023-02-17 PROCEDURE — 88300 SURGICAL PATH GROSS: CPT

## 2023-02-17 PROCEDURE — 88300 SURGICAL PATH GROSS: CPT | Mod: 26

## 2023-02-17 PROCEDURE — C1889: CPT

## 2023-02-17 RX ORDER — FAMOTIDINE 10 MG/ML
20 INJECTION INTRAVENOUS ONCE
Refills: 0 | Status: COMPLETED | OUTPATIENT
Start: 2023-02-17 | End: 2023-02-17

## 2023-02-17 RX ORDER — SODIUM CHLORIDE 9 MG/ML
3 INJECTION INTRAMUSCULAR; INTRAVENOUS; SUBCUTANEOUS EVERY 8 HOURS
Refills: 0 | Status: DISCONTINUED | OUTPATIENT
Start: 2023-02-17 | End: 2023-02-17

## 2023-02-17 RX ORDER — PHENAZOPYRIDINE HCL 100 MG
1 TABLET ORAL
Qty: 9 | Refills: 1
Start: 2023-02-17 | End: 2023-02-22

## 2023-02-17 RX ORDER — SODIUM CHLORIDE 9 MG/ML
1000 INJECTION, SOLUTION INTRAVENOUS
Refills: 0 | Status: DISCONTINUED | OUTPATIENT
Start: 2023-02-17 | End: 2023-02-17

## 2023-02-17 RX ORDER — FENTANYL CITRATE 50 UG/ML
50 INJECTION INTRAVENOUS
Refills: 0 | Status: DISCONTINUED | OUTPATIENT
Start: 2023-02-17 | End: 2023-02-17

## 2023-02-17 RX ORDER — ONDANSETRON 8 MG/1
4 TABLET, FILM COATED ORAL ONCE
Refills: 0 | Status: DISCONTINUED | OUTPATIENT
Start: 2023-02-17 | End: 2023-02-17

## 2023-02-17 RX ORDER — HYDROMORPHONE HYDROCHLORIDE 2 MG/ML
0.5 INJECTION INTRAMUSCULAR; INTRAVENOUS; SUBCUTANEOUS
Refills: 0 | Status: DISCONTINUED | OUTPATIENT
Start: 2023-02-17 | End: 2023-02-17

## 2023-02-17 RX ORDER — ACETAMINOPHEN 500 MG
1000 TABLET ORAL ONCE
Refills: 0 | Status: COMPLETED | OUTPATIENT
Start: 2023-02-17 | End: 2023-02-17

## 2023-02-17 RX ORDER — ASPIRIN/CALCIUM CARB/MAGNESIUM 324 MG
1 TABLET ORAL
Qty: 0 | Refills: 0 | DISCHARGE

## 2023-02-17 RX ORDER — POTASSIUM CITRATE MONOHYDRATE 100 %
1 POWDER (GRAM) MISCELLANEOUS
Qty: 60 | Refills: 3
Start: 2023-02-17 | End: 2023-06-16

## 2023-02-17 RX ORDER — METOCLOPRAMIDE HCL 10 MG
10 TABLET ORAL ONCE
Refills: 0 | Status: COMPLETED | OUTPATIENT
Start: 2023-02-17 | End: 2023-02-17

## 2023-02-17 RX ORDER — PHENAZOPYRIDINE HCL 100 MG
200 TABLET ORAL ONCE
Refills: 0 | Status: COMPLETED | OUTPATIENT
Start: 2023-02-17 | End: 2023-02-17

## 2023-02-17 RX ADMIN — Medication 200 MILLIGRAM(S): at 14:08

## 2023-02-17 RX ADMIN — Medication 10 MILLIGRAM(S): at 09:53

## 2023-02-17 RX ADMIN — Medication 400 MILLIGRAM(S): at 14:15

## 2023-02-17 RX ADMIN — FAMOTIDINE 20 MILLIGRAM(S): 10 INJECTION INTRAVENOUS at 09:53

## 2023-02-17 RX ADMIN — Medication 1000 MILLIGRAM(S): at 14:30

## 2023-02-17 NOTE — ASU PATIENT PROFILE, ADULT - AS SC BRADEN SENSORY
Alert-The patient is alert, awake and responds to voice. The patient is oriented to time, place, and person. The triage nurse is able to obtain subjective information. (4) no impairment

## 2023-02-17 NOTE — ASU PATIENT PROFILE, ADULT - FALL HARM RISK - UNIVERSAL INTERVENTIONS
Bed in lowest position, wheels locked, appropriate side rails in place/Call bell, personal items and telephone in reach/Instruct patient to call for assistance before getting out of bed or chair/Non-slip footwear when patient is out of bed/[place] to call system/Physically safe environment - no spills, clutter or unnecessary equipment/Purposeful Proactive Rounding/Room/bathroom lighting operational, light cord in reach

## 2023-02-17 NOTE — ASU PATIENT PROFILE, ADULT - NSICDXPASTMEDICALHX_GEN_ALL_CORE_FT
0 = understands/communicates without difficulty
PAST MEDICAL HISTORY:  2019 novel coronavirus disease (COVID-19)     DM (diabetes mellitus)     Gastric ulcer     HLD (hyperlipidemia)     HTN (hypertension)     Kidney stones

## 2023-02-17 NOTE — BRIEF OPERATIVE NOTE - NSICDXBRIEFPROCEDURE_GEN_ALL_CORE_FT
PROCEDURES:  Ureteroscopy, with laser lithotripsy and stent insertion 17-Feb-2023 13:56:09 Left Rahat Layton

## 2023-02-17 NOTE — ASU DISCHARGE PLAN (ADULT/PEDIATRIC) - NS MD DC FALL RISK RISK
For information on Fall & Injury Prevention, visit: [email or web address] OR  [email or web address] OR  https://www.cdc.gov/steadi/patient.html
unknown

## 2023-02-18 LAB
NIGHT BLUE STAIN TISS: SIGNIFICANT CHANGE UP
SPECIMEN SOURCE: SIGNIFICANT CHANGE UP

## 2023-02-19 LAB
CULTURE RESULTS: NO GROWTH — SIGNIFICANT CHANGE UP
CULTURE RESULTS: NO GROWTH — SIGNIFICANT CHANGE UP
SPECIMEN SOURCE: SIGNIFICANT CHANGE UP
SPECIMEN SOURCE: SIGNIFICANT CHANGE UP

## 2023-02-21 DIAGNOSIS — N20.0 CALCULUS OF KIDNEY: ICD-10-CM

## 2023-02-21 LAB — SURGICAL PATHOLOGY STUDY: SIGNIFICANT CHANGE UP

## 2023-02-22 DIAGNOSIS — N20.0 CALCULUS OF KIDNEY: ICD-10-CM

## 2023-02-23 DIAGNOSIS — Z87.440 PERSONAL HISTORY OF URINARY (TRACT) INFECTIONS: ICD-10-CM

## 2023-02-23 DIAGNOSIS — Z79.82 LONG TERM (CURRENT) USE OF ASPIRIN: ICD-10-CM

## 2023-02-23 DIAGNOSIS — Z79.899 OTHER LONG TERM (CURRENT) DRUG THERAPY: ICD-10-CM

## 2023-02-23 DIAGNOSIS — I10 ESSENTIAL (PRIMARY) HYPERTENSION: ICD-10-CM

## 2023-02-23 DIAGNOSIS — N20.0 CALCULUS OF KIDNEY: ICD-10-CM

## 2023-02-23 DIAGNOSIS — E78.5 HYPERLIPIDEMIA, UNSPECIFIED: ICD-10-CM

## 2023-02-23 DIAGNOSIS — E11.9 TYPE 2 DIABETES MELLITUS WITHOUT COMPLICATIONS: ICD-10-CM

## 2023-02-27 ENCOUNTER — OUTPATIENT (OUTPATIENT)
Dept: INPATIENT UNIT | Facility: HOSPITAL | Age: 63
LOS: 1 days | Discharge: HOME | End: 2023-02-27
Payer: COMMERCIAL

## 2023-02-27 ENCOUNTER — APPOINTMENT (OUTPATIENT)
Dept: UROLOGY | Facility: HOSPITAL | Age: 63
End: 2023-02-27

## 2023-02-27 ENCOUNTER — TRANSCRIPTION ENCOUNTER (OUTPATIENT)
Age: 63
End: 2023-02-27

## 2023-02-27 VITALS
WEIGHT: 164.02 LBS | SYSTOLIC BLOOD PRESSURE: 132 MMHG | RESPIRATION RATE: 14 BRPM | DIASTOLIC BLOOD PRESSURE: 86 MMHG | TEMPERATURE: 98 F | HEIGHT: 68 IN | HEART RATE: 80 BPM | OXYGEN SATURATION: 100 %

## 2023-02-27 VITALS
OXYGEN SATURATION: 100 % | TEMPERATURE: 97 F | DIASTOLIC BLOOD PRESSURE: 79 MMHG | HEART RATE: 60 BPM | SYSTOLIC BLOOD PRESSURE: 119 MMHG | RESPIRATION RATE: 16 BRPM

## 2023-02-27 DIAGNOSIS — Z98.890 OTHER SPECIFIED POSTPROCEDURAL STATES: Chronic | ICD-10-CM

## 2023-02-27 DIAGNOSIS — N20.0 CALCULUS OF KIDNEY: ICD-10-CM

## 2023-02-27 LAB
ANION GAP SERPL CALC-SCNC: 4 MMOL/L — LOW (ref 5–17)
APTT BLD: 29.4 SEC — SIGNIFICANT CHANGE UP (ref 27.5–35.5)
BUN SERPL-MCNC: 19 MG/DL — SIGNIFICANT CHANGE UP (ref 7–23)
CALCIUM SERPL-MCNC: 9.1 MG/DL — SIGNIFICANT CHANGE UP (ref 8.5–10.1)
CHLORIDE SERPL-SCNC: 108 MMOL/L — SIGNIFICANT CHANGE UP (ref 96–108)
CO2 SERPL-SCNC: 26 MMOL/L — SIGNIFICANT CHANGE UP (ref 22–31)
CREAT SERPL-MCNC: 0.99 MG/DL — SIGNIFICANT CHANGE UP (ref 0.5–1.3)
EGFR: 85 ML/MIN/1.73M2 — SIGNIFICANT CHANGE UP
GLUCOSE SERPL-MCNC: 206 MG/DL — HIGH (ref 70–99)
HCT VFR BLD CALC: 38.4 % — LOW (ref 39–50)
HGB BLD-MCNC: 13.1 G/DL — SIGNIFICANT CHANGE UP (ref 13–17)
INR BLD: 0.98 RATIO — SIGNIFICANT CHANGE UP (ref 0.88–1.16)
MCHC RBC-ENTMCNC: 31.8 PG — SIGNIFICANT CHANGE UP (ref 27–34)
MCHC RBC-ENTMCNC: 34.1 GM/DL — SIGNIFICANT CHANGE UP (ref 32–36)
MCV RBC AUTO: 93.2 FL — SIGNIFICANT CHANGE UP (ref 80–100)
NIDUS STONE QN: SIGNIFICANT CHANGE UP
PLATELET # BLD AUTO: 213 K/UL — SIGNIFICANT CHANGE UP (ref 150–400)
POTASSIUM SERPL-MCNC: 4 MMOL/L — SIGNIFICANT CHANGE UP (ref 3.5–5.3)
POTASSIUM SERPL-SCNC: 4 MMOL/L — SIGNIFICANT CHANGE UP (ref 3.5–5.3)
PROTHROM AB SERPL-ACNC: 11.4 SEC — SIGNIFICANT CHANGE UP (ref 10.5–13.4)
RBC # BLD: 4.12 M/UL — LOW (ref 4.2–5.8)
RBC # FLD: 12.4 % — SIGNIFICANT CHANGE UP (ref 10.3–14.5)
SODIUM SERPL-SCNC: 138 MMOL/L — SIGNIFICANT CHANGE UP (ref 135–145)
WBC # BLD: 5.27 K/UL — SIGNIFICANT CHANGE UP (ref 3.8–10.5)
WBC # FLD AUTO: 5.27 K/UL — SIGNIFICANT CHANGE UP (ref 3.8–10.5)

## 2023-02-27 PROCEDURE — 52310 CYSTOSCOPY AND TREATMENT: CPT | Mod: LT

## 2023-02-27 PROCEDURE — 87086 URINE CULTURE/COLONY COUNT: CPT

## 2023-02-27 PROCEDURE — 36415 COLL VENOUS BLD VENIPUNCTURE: CPT

## 2023-02-27 PROCEDURE — 85027 COMPLETE CBC AUTOMATED: CPT

## 2023-02-27 PROCEDURE — 80048 BASIC METABOLIC PNL TOTAL CA: CPT

## 2023-02-27 PROCEDURE — 85730 THROMBOPLASTIN TIME PARTIAL: CPT

## 2023-02-27 PROCEDURE — 85610 PROTHROMBIN TIME: CPT

## 2023-02-27 RX ORDER — PHENAZOPYRIDINE HCL 100 MG
1 TABLET ORAL
Qty: 9 | Refills: 0
Start: 2023-02-27 | End: 2023-03-01

## 2023-02-27 RX ORDER — SODIUM CHLORIDE 9 MG/ML
1000 INJECTION, SOLUTION INTRAVENOUS
Refills: 0 | Status: DISCONTINUED | OUTPATIENT
Start: 2023-02-27 | End: 2023-02-27

## 2023-02-27 RX ORDER — OXYCODONE HYDROCHLORIDE 5 MG/1
10 TABLET ORAL ONCE
Refills: 0 | Status: DISCONTINUED | OUTPATIENT
Start: 2023-02-27 | End: 2023-02-27

## 2023-02-27 RX ORDER — EMPAGLIFLOZIN 10 MG/1
1 TABLET, FILM COATED ORAL
Qty: 0 | Refills: 0 | DISCHARGE

## 2023-02-27 RX ORDER — ALIROCUMAB 75 MG/ML
1 INJECTION, SOLUTION SUBCUTANEOUS
Qty: 0 | Refills: 0 | DISCHARGE

## 2023-02-27 RX ORDER — BACLOFEN 100 %
1 POWDER (GRAM) MISCELLANEOUS
Qty: 0 | Refills: 0 | DISCHARGE

## 2023-02-27 RX ORDER — OLMESARTAN MEDOXOMIL 5 MG/1
1 TABLET, FILM COATED ORAL
Qty: 0 | Refills: 0 | DISCHARGE

## 2023-02-27 RX ORDER — PHENAZOPYRIDINE HCL 100 MG
200 TABLET ORAL ONCE
Refills: 0 | Status: COMPLETED | OUTPATIENT
Start: 2023-02-27 | End: 2023-02-27

## 2023-02-27 RX ORDER — TAMSULOSIN HYDROCHLORIDE 0.4 MG/1
1 CAPSULE ORAL
Qty: 0 | Refills: 0 | DISCHARGE

## 2023-02-27 RX ADMIN — Medication 200 MILLIGRAM(S): at 12:11

## 2023-02-27 NOTE — ASU PATIENT PROFILE, ADULT - FALL HARM RISK - UNIVERSAL INTERVENTIONS
Bed in lowest position, wheels locked, appropriate side rails in place/Call bell, personal items and telephone in reach/Instruct patient to call for assistance before getting out of bed or chair/Non-slip footwear when patient is out of bed/[place] to call system/Physically safe environment - no spills, clutter or unnecessary equipment/Purposeful Proactive Rounding/Room/bathroom lighting operational, light cord in reach

## 2023-02-27 NOTE — BRIEF OPERATIVE NOTE - NSICDXBRIEFPOSTOP_GEN_ALL_CORE_FT
POST-OP DIAGNOSIS:  Kidney stone on left side 27-Feb-2023 12:06:30  Rahat Layton  Encounter for removal of ureteral stent 27-Feb-2023 12:06:48  Rahat Layton

## 2023-02-27 NOTE — ASU PATIENT PROFILE, ADULT - NSICDXPASTSURGICALHX_GEN_ALL_CORE_FT
PAST SURGICAL HISTORY:  H/O lithotripsy x 3 stent placement x2    History of hernia repair x2 b/l inguinal

## 2023-02-27 NOTE — ASU PREOP CHECKLIST - ALLERGIES REVIEWED
October 27, 2020        Russell Lawrence  [address] [place] [address]    To Whom It May Concern:    This is to certify Russell Lawrence was evaluated on 10/27/20 and is unable to return to work.    Russell Lawrence should self-quarantine until at least 10/30/2020.  ?  CDC guidelines for return to work are as follows:  • At least 24 hours have passed since fever resolution without use of fever reducing medication and   • Symptoms have improved and  • At least 10 days have passed since symptoms first appeared (1020/2020)    Many employers are asking that employees be seen and reexamined to return to work. We ask that you follow the CDC guidelines above and that you do not ask that your employees be seen back in the clinic setting for a Return to Work slip when off due to presumed Covid related symptoms.    The Coronavirus is a rapidly evolving situation and recommendations are changing regularly to prevent spread of the disease and further loss of life.    Thank you for your understanding during these unusual times.        Electronically signed by:   IRINA Donato E [place] [facility] [id number]    
done

## 2023-02-27 NOTE — ASU PREOP CHECKLIST - BP NONINVASIVE SYSTOLIC (MM HG)
Problem: Patient Care Overview  Goal: Plan of Care Review  Outcome: Ongoing (interventions implemented as appropriate)  Patient bleeding and leaking from new IV site. Team called and nurse told to wait to put in new IV. Nurse and charge nurse tried twice each. Will pass to night shift.       132

## 2023-02-27 NOTE — ASU PATIENT PROFILE, ADULT - NSICDXPASTMEDICALHX_GEN_ALL_CORE_FT
PAST MEDICAL HISTORY:  2019 novel coronavirus disease (COVID-19)     DM (diabetes mellitus)     Gastric ulcer     Herpes zoster     History of claustrophobia     HLD (hyperlipidemia)     HTN (hypertension)     Kidney stones     Radiculopathy, cervical

## 2023-02-27 NOTE — BRIEF OPERATIVE NOTE - NSICDXBRIEFPREOP_GEN_ALL_CORE_FT
PRE-OP DIAGNOSIS:  Kidney stone on left side 27-Feb-2023 12:06:21  Rahat Laytno  Encounter for removal of ureteral stent 27-Feb-2023 12:06:40  Rahat Layton

## 2023-02-27 NOTE — ASU DISCHARGE PLAN (ADULT/PEDIATRIC) - CARE PROVIDER_API CALL
Rahat Layton  [address]  [address] [place] [address]  Phone: [phone]  Fax: (   )    -  Follow Up Time: 2 months

## 2023-02-27 NOTE — ASU DISCHARGE PLAN (ADULT/PEDIATRIC) - PROVIDER TOKENS
FREE:[LAST:[Kinjal],FIRST:[Rahat],PHONE:[phone]],FAX:[(   )    -],ADDRESS:[[address] [address]],FOLLOWUP:[2 months]]

## 2023-03-01 LAB
CULTURE RESULTS: SIGNIFICANT CHANGE UP
SPECIMEN SOURCE: SIGNIFICANT CHANGE UP

## 2023-03-02 ENCOUNTER — NON-APPOINTMENT (OUTPATIENT)
Age: 63
End: 2023-03-02

## 2023-03-02 DIAGNOSIS — Z79.4 LONG TERM (CURRENT) USE OF INSULIN: ICD-10-CM

## 2023-03-02 DIAGNOSIS — Z79.82 LONG TERM (CURRENT) USE OF ASPIRIN: ICD-10-CM

## 2023-03-02 DIAGNOSIS — E11.9 TYPE 2 DIABETES MELLITUS WITHOUT COMPLICATIONS: ICD-10-CM

## 2023-03-02 DIAGNOSIS — Z46.6 ENCOUNTER FOR FITTING AND ADJUSTMENT OF URINARY DEVICE: ICD-10-CM

## 2023-03-02 DIAGNOSIS — Z87.442 PERSONAL HISTORY OF URINARY CALCULI: ICD-10-CM

## 2023-03-02 DIAGNOSIS — N40.0 BENIGN PROSTATIC HYPERPLASIA WITHOUT LOWER URINARY TRACT SYMPTOMS: ICD-10-CM

## 2023-03-02 DIAGNOSIS — I10 ESSENTIAL (PRIMARY) HYPERTENSION: ICD-10-CM

## 2023-03-18 LAB
CULTURE RESULTS: SIGNIFICANT CHANGE UP
SPECIMEN SOURCE: SIGNIFICANT CHANGE UP

## 2023-04-05 LAB
CULTURE RESULTS: SIGNIFICANT CHANGE UP
SPECIMEN SOURCE: SIGNIFICANT CHANGE UP

## 2023-05-31 ENCOUNTER — OFFICE (OUTPATIENT)
Dept: URBAN - METROPOLITAN AREA CLINIC 88 | Facility: CLINIC | Age: 63
Setting detail: OPHTHALMOLOGY
End: 2023-05-31
Payer: MEDICARE

## 2023-05-31 DIAGNOSIS — H31.002: ICD-10-CM

## 2023-05-31 DIAGNOSIS — H43.813: ICD-10-CM

## 2023-05-31 DIAGNOSIS — H35.033: ICD-10-CM

## 2023-05-31 DIAGNOSIS — E11.9: ICD-10-CM

## 2023-05-31 DIAGNOSIS — H35.372: ICD-10-CM

## 2023-05-31 PROCEDURE — 92134 CPTRZ OPH DX IMG PST SGM RTA: CPT | Performed by: SPECIALIST

## 2023-05-31 PROCEDURE — 99213 OFFICE O/P EST LOW 20 MIN: CPT | Performed by: SPECIALIST

## 2023-05-31 PROCEDURE — 92235 FLUORESCEIN ANGRPH MLTIFRAME: CPT | Performed by: SPECIALIST

## 2023-05-31 ASSESSMENT — CONFRONTATIONAL VISUAL FIELD TEST (CVF)
OD_FINDINGS: FULL
OS_FINDINGS: FULL

## 2023-05-31 ASSESSMENT — REFRACTION_AUTOREFRACTION
OD_AXIS: 093
OS_CYLINDER: -0.75
OD_CYLINDER: -3.25
OD_SPHERE: +1.50
OS_AXIS: 014
OS_SPHERE: +0.75

## 2023-05-31 ASSESSMENT — REFRACTION_CURRENTRX
OD_SPHERE: +1.25
OS_OVR_VA: 20/
OD_OVR_VA: 20/
OS_VPRISM_DIRECTION: SV
OD_CYLINDER: -3.00
OD_AXIS: 84
OS_SPHERE: +0.50
OD_VPRISM_DIRECTION: SV
OS_AXIS: 173
OS_CYLINDER: -0.75

## 2023-05-31 ASSESSMENT — TONOMETRY
OS_IOP_MMHG: 19
OD_IOP_MMHG: 17

## 2023-05-31 ASSESSMENT — VISUAL ACUITY
OD_BCVA: 20/70-2
OS_BCVA: 20/25

## 2023-05-31 ASSESSMENT — SPHEQUIV_DERIVED
OD_SPHEQUIV: -0.125
OS_SPHEQUIV: 0.375

## 2023-06-01 ENCOUNTER — NON-APPOINTMENT (OUTPATIENT)
Age: 63
End: 2023-06-01

## 2023-06-20 ENCOUNTER — APPOINTMENT (OUTPATIENT)
Dept: UROLOGY | Facility: CLINIC | Age: 63
End: 2023-06-20
Payer: MEDICARE

## 2023-06-20 VITALS
BODY MASS INDEX: 25.46 KG/M2 | HEIGHT: 68 IN | WEIGHT: 168 LBS | SYSTOLIC BLOOD PRESSURE: 129 MMHG | DIASTOLIC BLOOD PRESSURE: 81 MMHG | HEART RATE: 52 BPM

## 2023-06-20 PROCEDURE — 99214 OFFICE O/P EST MOD 30 MIN: CPT

## 2023-06-26 NOTE — LETTER BODY
[Dear  ___] : Dear  [unfilled], [Courtesy Letter:] : I had the pleasure of seeing your patient, [unfilled], in my office today. [Please see my note below.] : Please see my note below. [Sincerely,] : Sincerely, [FreeTextEntry3] : Rahat Layton MD\par  of Urology\par [facility] [employer]\par \par [facility]\par Offices:\par [address]", [facility]\par [address] [address]\par [address] [facility], [address]\par \par TEL: [phone]\par FAX: [phone]

## 2023-06-26 NOTE — ASSESSMENT
[FreeTextEntry1] : Reviewed records.\par Discussed labs. \par \par Discussed stone analysis: \par 100% Uric acid.\par \par History of kidney stone:\par Will get Renal Bladder Ultrasound and X ray of Kidney, Ureter and Bladder.\par \par Disorder of calcium metabolism:\par Reiterated kidney stone prevention diet. \par Continue Potassium Citrate.  \par Will get Parathyroid Hormone with Calcium, Uric acid and 24 hour urine test. \par \par Benign Prostatic Hyperplasia:\par Will take Flomax (2 caps). \par \par Return to office after Ultrasound.

## 2023-06-26 NOTE — HISTORY OF PRESENT ILLNESS
[FreeTextEntry1] : 63 year old male presents for follow up. \par Taking Flomax, has variable stream, daytime frequency every 2 hours or so and nocturia 2 x. \par Denies hesitancy, straining, intermittency, urgency, incontinence, sense of incomplete emptying. \par Denies dysuria, hematuria, lower abdominal or flank pain, nausea, vomiting, fever, chills or rigors. \par Also taking Potassium Citrate. \par Did not do Renal and Bladder Ultrasound and 24 hour urine test. \par \par Status post Cystoscopy and right ureteral stent removal on 2/27/23 at [facility]. \par \par Status post left ureteroscopy, laser lithotripsy, stone extraction and ureteral stent exchange on 2/17/23 at [facility]. \par \par Status post left ureteroscopy, laser lithotripsy, stone extraction and ureteral stent placement on 1/12/23 at [facility]. \par Was admitted at [facility] after ureteroscopy for Pyelonephritis. \par \par With Silodosin had painful ejaculation and retrograde ejaculation. \par \par Seen on 8/30/22. Passed a small stone.\par Still had off and on blood tinged urine. Also had off and on back pain. \par Tried Silodosin 4 mg for 4 days had retrograde ejaculation, stopped. \par With Flomax had headache and increased heart rate. \par Also had reported tiredness and dizziness. \par With Alfuzosin in the past had low BP. \par \par Initially seen for kidney stone. \par Had bilateral kidney stones and was to undergo ESWL with Dr Gusman at [facility] and cannot follow with him due to Insurance issues. \par Has history of kidney stones, passed a few, has required ESWL and Ureteroscopy in the past. \par Reported slow and steady stream, urinates 4-5 x or so during the day. Nocturia of 1-2 x. \par Denied hesitancy, straining, intermittency, urgency, incontinence, sense of incomplete emptying.\par Denied dysuria, hematuria, lower abdominal or flank pain, fever, chills or rigors.\par Normal libido and erections. \par No family history of Prostate cancer. \par \par Has tried Flomax in the past, had dizziness. \par \par

## 2023-06-29 ENCOUNTER — APPOINTMENT (OUTPATIENT)
Dept: RADIOLOGY | Facility: CLINIC | Age: 63
End: 2023-06-29
Payer: MEDICARE

## 2023-06-29 ENCOUNTER — NON-APPOINTMENT (OUTPATIENT)
Age: 63
End: 2023-06-29

## 2023-06-29 ENCOUNTER — APPOINTMENT (OUTPATIENT)
Dept: ULTRASOUND IMAGING | Facility: CLINIC | Age: 63
End: 2023-06-29
Payer: MEDICARE

## 2023-06-29 ENCOUNTER — OUTPATIENT (OUTPATIENT)
Dept: OUTPATIENT SERVICES | Facility: HOSPITAL | Age: 63
LOS: 1 days | End: 2023-06-29
Payer: MEDICARE

## 2023-06-29 DIAGNOSIS — Z98.890 OTHER SPECIFIED POSTPROCEDURAL STATES: Chronic | ICD-10-CM

## 2023-06-29 DIAGNOSIS — Z87.442 PERSONAL HISTORY OF URINARY CALCULI: ICD-10-CM

## 2023-06-29 DIAGNOSIS — N40.1 BENIGN PROSTATIC HYPERPLASIA WITH LOWER URINARY TRACT SYMPTOMS: ICD-10-CM

## 2023-06-29 PROCEDURE — 76770 US EXAM ABDO BACK WALL COMP: CPT | Mod: 26

## 2023-06-29 PROCEDURE — 74018 RADEX ABDOMEN 1 VIEW: CPT | Mod: 26

## 2023-06-29 PROCEDURE — 76770 US EXAM ABDO BACK WALL COMP: CPT

## 2023-06-29 PROCEDURE — 74018 RADEX ABDOMEN 1 VIEW: CPT

## 2023-07-06 ENCOUNTER — NON-APPOINTMENT (OUTPATIENT)
Age: 63
End: 2023-07-06

## 2023-07-25 ENCOUNTER — APPOINTMENT (OUTPATIENT)
Dept: UROLOGY | Facility: CLINIC | Age: 63
End: 2023-07-25
Payer: MEDICARE

## 2023-07-25 VITALS
SYSTOLIC BLOOD PRESSURE: 104 MMHG | WEIGHT: 163 LBS | HEART RATE: 72 BPM | DIASTOLIC BLOOD PRESSURE: 69 MMHG | BODY MASS INDEX: 24.71 KG/M2 | HEIGHT: 68 IN

## 2023-07-25 PROCEDURE — 99214 OFFICE O/P EST MOD 30 MIN: CPT

## 2023-07-25 RX ORDER — POTASSIUM CITRATE 15 MEQ/1
15 MEQ TABLET, EXTENDED RELEASE ORAL
Qty: 180 | Refills: 1 | Status: DISCONTINUED | COMMUNITY
Start: 2023-06-01 | End: 2023-07-25

## 2023-07-25 RX ORDER — POTASSIUM CITRATE AND CITRIC ACID 334; 1100 MG/5ML; MG/5ML
1100-334 SOLUTION ORAL
Qty: 1800 | Refills: 3 | Status: ACTIVE | COMMUNITY
Start: 2023-07-25 | End: 1900-01-01

## 2023-07-26 NOTE — HISTORY OF PRESENT ILLNESS
[FreeTextEntry1] : 63 year old male presents for follow up. \par Had Labs and Renal Bladder Ultrasound and X ray of Kidney, Ureter and Bladder. \par Patient has been restarted on metformin.\par \par Seen on 6/20/2023.\par On Flomax, had variable stream, daytime frequency every 2 hours or so and nocturia 2 x. \par Denied hesitancy, straining, intermittency, urgency, incontinence, sense of incomplete emptying. \par Denied dysuria, hematuria, lower abdominal or flank pain, nausea, vomiting, fever, chills or rigors. \par On Potassium Citrate. \par \par Status post Cystoscopy and left ureteral stent removal on 2/27/23 at [facility]. \par \par Status post left ureteroscopy, laser lithotripsy, stone extraction and ureteral stent exchange on 2/17/23 at [facility]. \par Stone analysis:  100% Uric acid. \par \par Status post left ureteroscopy, laser lithotripsy, stone extraction and ureteral stent placement on 1/12/23 at [facility]. \par Stone analysis:  100% Uric acid . \par Was admitted at [facility] after ureteroscopy for Pyelonephritis. \par \par With Silodosin had painful ejaculation and retrograde ejaculation. \par \par Initially seen for kidney stone. \par Had bilateral kidney stones and was to undergo ESWL with Dr Gusman at [facility] and cannot follow with him due to Insurance issues. \par Has history of kidney stones, passed a few, has required ESWL and Ureteroscopy in the past. \par Reported slow and steady stream, urinates 4-5 x or so during the day. Nocturia of 1-2 x. \par Denied hesitancy, straining, intermittency, urgency, incontinence, sense of incomplete emptying.\par Denied dysuria, hematuria, lower abdominal or flank pain, fever, chills or rigors.\par Normal libido and erections. \par No family history of Prostate cancer. \par \par Has tried Flomax in the past, had dizziness. \par \par

## 2023-07-26 NOTE — ASSESSMENT
[FreeTextEntry1] : Reviewed records.\par Discussed labs and imaging. \par 6/22/2023:\par PTH: 27\par Uric acid: 5.3\par \par 24 hour urine test(6/23/2023):\par Volume: 2.5 L\par pH: 5.1\par Calcium: 175\par Sodium: 170\par Oxalate: 30\par Citrate: 670\par \par Benign Prostatic Hyperplasia:\par Prostate volume: 43 cc, postvoid residual: 17 mL on renal bladder ultrasound.\par Discussed treatment options, will continue Flomax. \par \par Disorder of calcium metabolism:\par Discussed treatment options.\par Will change Potassium citrate to Cytra K.\par Recommended to increase citrate in diet.\par Will get 24 hour urine test in 3 months.\par \par Kidney stone: \par 5 mm midpole nonobstructing bilateral kidney stones on renal bladder ultrasound.  Not seen on x-ray of the kidney ureter bladder.\par Discussed the management options for non obstructing kidney stones- watch and wait Vs Ureteroscopy Vs Shock wave lithotripsy- if radio-opaque or ultrasound amenable. \par Risks and benefits of each modality were discussed. \par Patient will consider observe.  \par \par Return to office in 6 months or sooner if any issues.

## 2023-07-26 NOTE — LETTER BODY
[Dear  ___] : Dear  [unfilled], [Courtesy Letter:] : I had the pleasure of seeing your patient, [unfilled], in my office today. [Please see my note below.] : Please see my note below. [Sincerely,] : Sincerely, [FreeTextEntry3] : Rahat Layton MD\par  of Urology\par [facility]\par \par [facility]\par Offices:\par [address] [facility], [facility]\par [address] [address]\par 8 [facility], [address]\par \par TEL: [phone]\par FAX: [phone]

## 2023-08-28 NOTE — ASU PATIENT PROFILE, ADULT - CENTRAL VENOUS CATHETER
Goal Outcome Evaluation:  Plan of Care Reviewed With: patient           Outcome Evaluation: Pt seen for PT nicole this afternoon. She is a 74 year old female admitted to [facility] on 8/26/23w c/o dizziness, L sided weakness, and diarrhea. Pt w hx of lung cancer w brain mets s/p craniotomy as well as recent shingles affecting L UE. MRI negative for acute infarct. At baseline, pt lives at home w her niece and states she does require assistance w mobility and ADLs. She uses walker for ambulation. Currently, pt able to perform bed mobiltiy w SBA. She stood w CGA and then was able to ambulate approx 40 ft w CGA and Rwx. Distance limited 2' BR needs today. Pt plans home at DC w assist of family. She will continue to benefit from skilled PT to maximize safety, strength, and indepence w mobiltiy.      Anticipated Discharge Disposition (PT): home with assist   no

## 2023-12-27 RX ORDER — AMOXICILLIN AND CLAVULANATE POTASSIUM 875; 125 MG/1; MG/1
875-125 TABLET, COATED ORAL
Qty: 14 | Refills: 0 | Status: COMPLETED | COMMUNITY
Start: 2023-02-08 | End: 2023-12-27

## 2023-12-29 ENCOUNTER — OFFICE (OUTPATIENT)
Dept: URBAN - METROPOLITAN AREA CLINIC 88 | Facility: CLINIC | Age: 63
Setting detail: OPHTHALMOLOGY
End: 2023-12-29
Payer: MEDICARE

## 2023-12-29 DIAGNOSIS — H43.813: ICD-10-CM

## 2023-12-29 DIAGNOSIS — E11.9: ICD-10-CM

## 2023-12-29 DIAGNOSIS — H35.033: ICD-10-CM

## 2023-12-29 DIAGNOSIS — H31.002: ICD-10-CM

## 2023-12-29 DIAGNOSIS — H35.372: ICD-10-CM

## 2023-12-29 PROCEDURE — 99213 OFFICE O/P EST LOW 20 MIN: CPT | Performed by: OPHTHALMOLOGY

## 2023-12-29 PROCEDURE — 92134 CPTRZ OPH DX IMG PST SGM RTA: CPT | Performed by: OPHTHALMOLOGY

## 2023-12-29 ASSESSMENT — SPHEQUIV_DERIVED
OS_SPHEQUIV: 0.375
OD_SPHEQUIV: -0.125

## 2023-12-29 ASSESSMENT — REFRACTION_AUTOREFRACTION
OD_AXIS: 093
OS_SPHERE: +0.75
OD_SPHERE: +1.50
OS_CYLINDER: -0.75
OS_AXIS: 014
OD_CYLINDER: -3.25

## 2023-12-29 ASSESSMENT — CONFRONTATIONAL VISUAL FIELD TEST (CVF)
OD_FINDINGS: FULL
OS_FINDINGS: FULL

## 2024-01-30 ENCOUNTER — APPOINTMENT (OUTPATIENT)
Dept: UROLOGY | Facility: CLINIC | Age: 64
End: 2024-01-30
Payer: MEDICARE

## 2024-01-30 VITALS
BODY MASS INDEX: 24.71 KG/M2 | SYSTOLIC BLOOD PRESSURE: 113 MMHG | DIASTOLIC BLOOD PRESSURE: 77 MMHG | HEIGHT: 68 IN | HEART RATE: 89 BPM | OXYGEN SATURATION: 98 % | WEIGHT: 163 LBS

## 2024-01-30 DIAGNOSIS — E83.50 UNSPECIFIED DISORDER OF CALCIUM METABOLISM: ICD-10-CM

## 2024-01-30 DIAGNOSIS — N40.1 BENIGN PROSTATIC HYPERPLASIA WITH LOWER URINARY TRACT SYMPMS: ICD-10-CM

## 2024-01-30 DIAGNOSIS — Z87.442 PERSONAL HISTORY OF URINARY CALCULI: ICD-10-CM

## 2024-01-30 DIAGNOSIS — N13.8 BENIGN PROSTATIC HYPERPLASIA WITH LOWER URINARY TRACT SYMPMS: ICD-10-CM

## 2024-01-30 DIAGNOSIS — N20.0 CALCULUS OF KIDNEY: ICD-10-CM

## 2024-01-30 DIAGNOSIS — Z12.5 ENCOUNTER FOR SCREENING FOR MALIGNANT NEOPLASM OF PROSTATE: ICD-10-CM

## 2024-01-30 PROCEDURE — 99214 OFFICE O/P EST MOD 30 MIN: CPT

## 2024-02-06 PROBLEM — Z87.442 HISTORY OF KIDNEY STONES: Status: ACTIVE | Noted: 2023-06-20

## 2024-02-06 PROBLEM — N20.0 BILATERAL KIDNEY STONES: Status: ACTIVE | Noted: 2022-02-04

## 2024-02-06 NOTE — LETTER BODY
[Dear  ___] : Dear  [unfilled], [Courtesy Letter:] : I had the pleasure of seeing your patient, [unfilled], in my office today. [Please see my note below.] : Please see my note below. [Sincerely,] : Sincerely, [FreeTextEntry3] : Rahat Layton MD\par   of Urology\par  [facility]\par  \par  [facility]\par  Offices:\par  [address] [facility], [facility]\par  [address] [address]\par  [address] [facility], [address]\par  \par  TEL: [phone]\par  FAX: [phone]

## 2024-02-06 NOTE — ASSESSMENT
[FreeTextEntry1] : Reviewed records. Discussed labs and imaging.   6/22/2023: PTH: 27 Uric acid: 5.3  24-hour urine test (11/3/2023): Volume: 2770 ml pH: 6.18 Calcium: 236 Sodium: 201 Oxalate: 38 Citrate: 1091  Benign Prostatic Hyperplasia: Discussed treatment options, will continue Flomax.   Disorder of calcium metabolism: Continue Cytra K. Will get 24 hour before follow up appointment.  Kidney stone:  5 mm mid pole non obstructing bilateral kidney stones on renal bladder ultrasound.   Not seen on x-ray of the kidney ureter bladder. Discussed the management options for non-obstructing kidney stones- watch and wait Vs Ureteroscopy Vs Shock wave lithotripsy- if radio-opaque or ultrasound amenable.  Risks and benefits of each modality were discussed.  Patient will continue to observe.   Will get Renal Bladder Ultrasound and X ray of Kidney, Ureter and Bladder before follow up appointment.  Return to office in 6 months or sooner if any issues.

## 2024-02-06 NOTE — HISTORY OF PRESENT ILLNESS
[FreeTextEntry1] : Primary care physician: Dr Anamaria Hyman.  63-year-old male presents for follow up. Had labs with PCP. Patient on Metformin and Jardiance.  On Cytra K.  On Flomax, has variable stream, daytime frequency every 2 to 3 hours or so and nocturia 2 x.  Denies hesitancy, straining, intermittency, urgency, incontinence or sense of incomplete emptying.  Denies dysuria, hematuria, lower abdominal or flank pain, nausea, vomiting, fever, chills or rigors.  Normal libido, erections and ejaculation.   Status post Cystoscopy and left ureteral stent removal on 2/27/23 at [facility].   Status post left ureteroscopy, laser lithotripsy, stone extraction and ureteral stent exchange on 2/17/23 at [facility].  Stone analysis: 100% Uric acid.   Status post left ureteroscopy, laser lithotripsy, stone extraction and ureteral stent placement on 1/12/23 at [facility].  Stone analysis: 100% Uric acid.  Was admitted at [facility] after ureteroscopy for Pyelonephritis.   With Silodosin had painful ejaculation and retrograde ejaculation.   Initially seen for kidney stone.  Had bilateral kidney stones and was to undergo ESWL with Dr Gusman at [facility] and cannot follow with him due to Insurance issues.  Has history of kidney stones, passed a few, has required ESWL and Ureteroscopy in the past.  Reported slow and steady stream, urinates 4-5 x or so during the day. Nocturia of 1-2 x.  Denied hesitancy, straining, intermittency, urgency, incontinence, sense of incomplete emptying. Denied dysuria, hematuria, lower abdominal or flank pain, fever, chills or rigors. Normal libido and erections.  No family history of Prostate cancer.   Has tried Flomax in the past, had dizziness.

## 2024-06-25 RX ORDER — TAMSULOSIN HYDROCHLORIDE 0.4 MG/1
0.4 CAPSULE ORAL
Qty: 90 | Refills: 0 | Status: ACTIVE | COMMUNITY
Start: 2022-02-04 | End: 1900-01-01

## 2024-07-02 ENCOUNTER — OFFICE (OUTPATIENT)
Dept: URBAN - METROPOLITAN AREA CLINIC 88 | Facility: CLINIC | Age: 64
Setting detail: OPHTHALMOLOGY
End: 2024-07-02
Payer: MEDICARE

## 2024-07-02 DIAGNOSIS — H35.372: ICD-10-CM

## 2024-07-02 DIAGNOSIS — H35.033: ICD-10-CM

## 2024-07-02 DIAGNOSIS — E11.9: ICD-10-CM

## 2024-07-02 DIAGNOSIS — H43.813: ICD-10-CM

## 2024-07-02 DIAGNOSIS — H31.002: ICD-10-CM

## 2024-07-02 PROCEDURE — 92134 CPTRZ OPH DX IMG PST SGM RTA: CPT | Performed by: OPHTHALMOLOGY

## 2024-07-02 PROCEDURE — 92014 COMPRE OPH EXAM EST PT 1/>: CPT | Performed by: OPHTHALMOLOGY

## 2024-07-02 ASSESSMENT — CONFRONTATIONAL VISUAL FIELD TEST (CVF)
OS_FINDINGS: FULL
OD_FINDINGS: FULL

## 2024-07-16 ENCOUNTER — APPOINTMENT (OUTPATIENT)
Dept: UROLOGY | Facility: CLINIC | Age: 64
End: 2024-07-16
Payer: MEDICARE

## 2024-07-16 VITALS
SYSTOLIC BLOOD PRESSURE: 133 MMHG | BODY MASS INDEX: 24.71 KG/M2 | WEIGHT: 163 LBS | HEIGHT: 68 IN | HEART RATE: 90 BPM | DIASTOLIC BLOOD PRESSURE: 74 MMHG

## 2024-07-16 DIAGNOSIS — N40.1 BENIGN PROSTATIC HYPERPLASIA WITH LOWER URINARY TRACT SYMPMS: ICD-10-CM

## 2024-07-16 DIAGNOSIS — E83.50 UNSPECIFIED DISORDER OF CALCIUM METABOLISM: ICD-10-CM

## 2024-07-16 DIAGNOSIS — N20.0 CALCULUS OF KIDNEY: ICD-10-CM

## 2024-07-16 DIAGNOSIS — N13.8 BENIGN PROSTATIC HYPERPLASIA WITH LOWER URINARY TRACT SYMPMS: ICD-10-CM

## 2024-07-16 PROCEDURE — 99214 OFFICE O/P EST MOD 30 MIN: CPT

## 2025-01-07 ENCOUNTER — APPOINTMENT (OUTPATIENT)
Dept: UROLOGY | Facility: CLINIC | Age: 65
End: 2025-01-07
Payer: MEDICARE

## 2025-01-07 VITALS — BODY MASS INDEX: 24.78 KG/M2 | WEIGHT: 163 LBS

## 2025-01-07 DIAGNOSIS — E83.50 UNSPECIFIED DISORDER OF CALCIUM METABOLISM: ICD-10-CM

## 2025-01-07 DIAGNOSIS — N13.8 BENIGN PROSTATIC HYPERPLASIA WITH LOWER URINARY TRACT SYMPMS: ICD-10-CM

## 2025-01-07 DIAGNOSIS — N20.0 CALCULUS OF KIDNEY: ICD-10-CM

## 2025-01-07 DIAGNOSIS — R35.0 FREQUENCY OF MICTURITION: ICD-10-CM

## 2025-01-07 DIAGNOSIS — N40.1 BENIGN PROSTATIC HYPERPLASIA WITH LOWER URINARY TRACT SYMPMS: ICD-10-CM

## 2025-01-07 PROCEDURE — 99214 OFFICE O/P EST MOD 30 MIN: CPT

## 2025-01-08 ENCOUNTER — APPOINTMENT (OUTPATIENT)
Dept: ULTRASOUND IMAGING | Facility: CLINIC | Age: 65
End: 2025-01-08

## 2025-01-12 PROBLEM — R35.0 URINARY FREQUENCY: Status: ACTIVE | Noted: 2025-01-12

## 2025-01-28 ENCOUNTER — APPOINTMENT (OUTPATIENT)
Dept: UROLOGY | Facility: CLINIC | Age: 65
End: 2025-01-28

## 2025-01-28 VITALS
HEIGHT: 68 IN | SYSTOLIC BLOOD PRESSURE: 131 MMHG | WEIGHT: 163 LBS | BODY MASS INDEX: 24.71 KG/M2 | DIASTOLIC BLOOD PRESSURE: 84 MMHG | HEART RATE: 78 BPM | RESPIRATION RATE: 16 BRPM

## 2025-01-28 DIAGNOSIS — N20.0 CALCULUS OF KIDNEY: ICD-10-CM

## 2025-01-28 DIAGNOSIS — N40.1 BENIGN PROSTATIC HYPERPLASIA WITH LOWER URINARY TRACT SYMPMS: ICD-10-CM

## 2025-01-28 DIAGNOSIS — N13.8 BENIGN PROSTATIC HYPERPLASIA WITH LOWER URINARY TRACT SYMPMS: ICD-10-CM

## 2025-01-28 DIAGNOSIS — R35.0 FREQUENCY OF MICTURITION: ICD-10-CM

## 2025-01-28 PROCEDURE — 51741 ELECTRO-UROFLOWMETRY FIRST: CPT

## 2025-01-28 PROCEDURE — 99214 OFFICE O/P EST MOD 30 MIN: CPT | Mod: 25

## 2025-02-09 PROBLEM — N20.0 URIC ACID NEPHROLITHIASIS: Status: ACTIVE | Noted: 2025-02-09

## 2025-02-09 PROBLEM — N20.0 RECURRENT NEPHROLITHIASIS: Noted: 2021-10-21

## 2025-03-25 ENCOUNTER — APPOINTMENT (OUTPATIENT)
Dept: UROLOGY | Facility: CLINIC | Age: 65
End: 2025-03-25

## 2025-04-22 ENCOUNTER — APPOINTMENT (OUTPATIENT)
Dept: UROLOGY | Facility: CLINIC | Age: 65
End: 2025-04-22

## 2025-04-22 VITALS
HEIGHT: 68 IN | OXYGEN SATURATION: 97 % | RESPIRATION RATE: 16 BRPM | WEIGHT: 163 LBS | SYSTOLIC BLOOD PRESSURE: 138 MMHG | HEART RATE: 88 BPM | BODY MASS INDEX: 24.71 KG/M2 | DIASTOLIC BLOOD PRESSURE: 77 MMHG

## 2025-04-22 DIAGNOSIS — R82.991 HYPOCITRATURIA: ICD-10-CM

## 2025-04-22 DIAGNOSIS — N40.1 BENIGN PROSTATIC HYPERPLASIA WITH LOWER URINARY TRACT SYMPMS: ICD-10-CM

## 2025-04-22 DIAGNOSIS — N20.0 CALCULUS OF KIDNEY: ICD-10-CM

## 2025-04-22 DIAGNOSIS — E83.50 UNSPECIFIED DISORDER OF CALCIUM METABOLISM: ICD-10-CM

## 2025-04-22 DIAGNOSIS — N13.8 BENIGN PROSTATIC HYPERPLASIA WITH LOWER URINARY TRACT SYMPMS: ICD-10-CM

## 2025-04-22 PROCEDURE — 99214 OFFICE O/P EST MOD 30 MIN: CPT | Mod: 25

## 2025-04-22 PROCEDURE — 51741 ELECTRO-UROFLOWMETRY FIRST: CPT

## 2025-06-17 ENCOUNTER — APPOINTMENT (OUTPATIENT)
Dept: FAMILY MEDICINE | Facility: CLINIC | Age: 65
End: 2025-06-17
Payer: MEDICARE

## 2025-06-17 VITALS
SYSTOLIC BLOOD PRESSURE: 122 MMHG | HEART RATE: 88 BPM | WEIGHT: 162 LBS | HEIGHT: 68 IN | OXYGEN SATURATION: 98 % | BODY MASS INDEX: 24.55 KG/M2 | DIASTOLIC BLOOD PRESSURE: 80 MMHG

## 2025-06-17 PROBLEM — G47.00 DIFFICULTY FALLING ASLEEP AT NIGHT UNTIL EARLY MORNING HOURS: Status: ACTIVE | Noted: 2025-06-17

## 2025-06-17 PROBLEM — Z82.49 FAMILY HISTORY OF CARDIAC DISORDER: Status: ACTIVE | Noted: 2025-06-17

## 2025-06-17 PROBLEM — Z82.49 FAMILY HISTORY OF MYOCARDIAL INFARCTION: Status: ACTIVE | Noted: 2025-06-17

## 2025-06-17 PROBLEM — Z83.3 FAMILY HISTORY OF DIABETES MELLITUS: Status: ACTIVE | Noted: 2025-06-17

## 2025-06-17 PROBLEM — R53.83 FATIGUE: Status: ACTIVE | Noted: 2025-06-17

## 2025-06-17 PROBLEM — Z82.49 FAMILY HISTORY OF AORTIC ANEURYSM: Status: ACTIVE | Noted: 2025-06-17

## 2025-06-17 PROBLEM — I10 HYPERTENSION, UNSPECIFIED TYPE: Status: ACTIVE | Noted: 2025-06-17

## 2025-06-17 PROBLEM — M54.50 LOW BACK PAIN: Status: ACTIVE | Noted: 2025-06-17

## 2025-06-17 PROCEDURE — G2211 COMPLEX E/M VISIT ADD ON: CPT

## 2025-06-17 PROCEDURE — 99204 OFFICE O/P NEW MOD 45 MIN: CPT

## 2025-06-17 PROCEDURE — 99214 OFFICE O/P EST MOD 30 MIN: CPT

## 2025-06-17 RX ORDER — BACLOFEN 10 MG/1
10 TABLET ORAL
Qty: 30 | Refills: 0 | Status: ACTIVE | COMMUNITY
Start: 2025-06-17 | End: 1900-01-01

## 2025-06-17 RX ORDER — OLMESARTAN MEDOXOMIL AND HYDROCHLOROTHIAZIDE 40; 12.5 MG/1; MG/1
40-12.5 TABLET ORAL DAILY
Refills: 0 | Status: ACTIVE | COMMUNITY

## 2025-06-17 RX ORDER — SAW/PYGEUM/BETA/HERB/D3/B6/ZN 30 MG-25MG
200 CAPSULE ORAL DAILY
Refills: 0 | Status: ACTIVE | COMMUNITY
Start: 2025-06-17

## 2025-06-17 RX ORDER — MAGNESIUM OXIDE 241.3 MG/1000MG
400 TABLET ORAL DAILY
Qty: 90 | Refills: 1 | Status: ACTIVE | COMMUNITY
Start: 2025-06-17 | End: 1900-01-01

## 2025-06-17 RX ORDER — ERGOCALCIFEROL 1.25 MG/1
1.25 MG CAPSULE ORAL
Refills: 0 | Status: ACTIVE | COMMUNITY
Start: 2025-06-17

## 2025-06-17 RX ORDER — TAMSULOSIN HYDROCHLORIDE 0.4 MG/1
0.4 CAPSULE ORAL
Qty: 90 | Refills: 1 | Status: ACTIVE | COMMUNITY
Start: 2025-06-17

## 2025-06-17 RX ORDER — EMPAGLIFLOZIN 25 MG/1
25 TABLET, FILM COATED ORAL DAILY
Qty: 90 | Refills: 1 | Status: ACTIVE | COMMUNITY

## 2025-06-17 RX ORDER — METFORMIN ER 500 MG 500 MG/1
500 TABLET ORAL DAILY
Refills: 0 | Status: ACTIVE | COMMUNITY

## 2025-06-17 RX ORDER — NAPROXEN 500 MG/1
500 TABLET ORAL
Qty: 60 | Refills: 1 | Status: ACTIVE | COMMUNITY
Start: 2025-06-17 | End: 1900-01-01

## 2025-06-17 RX ORDER — ALIROCUMAB 75 MG/ML
75 INJECTION, SOLUTION SUBCUTANEOUS
Refills: 0 | Status: ACTIVE | COMMUNITY

## 2025-07-02 ENCOUNTER — NON-APPOINTMENT (OUTPATIENT)
Age: 65
End: 2025-07-02

## 2025-07-02 PROBLEM — A69.20 LYME DISEASE: Status: ACTIVE | Noted: 2025-07-02

## 2025-07-02 RX ORDER — DOXYCYCLINE HYCLATE 100 MG/1
100 TABLET, COATED ORAL TWICE DAILY
Qty: 56 | Refills: 0 | Status: ACTIVE | COMMUNITY
Start: 2025-07-02 | End: 1900-01-01

## 2025-07-09 RX ORDER — DOXYCYCLINE HYCLATE 100 MG/1
100 TABLET, COATED ORAL TWICE DAILY
Qty: 56 | Refills: 0 | Status: ACTIVE | COMMUNITY
Start: 2025-07-09 | End: 1900-01-01

## 2025-08-20 ENCOUNTER — TRANSCRIPTION ENCOUNTER (OUTPATIENT)
Age: 65
End: 2025-08-20

## 2025-08-20 ENCOUNTER — APPOINTMENT (OUTPATIENT)
Dept: FAMILY MEDICINE | Facility: CLINIC | Age: 65
End: 2025-08-20
Payer: MEDICARE

## 2025-08-20 VITALS
OXYGEN SATURATION: 98 % | WEIGHT: 167 LBS | HEART RATE: 82 BPM | HEIGHT: 68 IN | BODY MASS INDEX: 25.31 KG/M2 | SYSTOLIC BLOOD PRESSURE: 110 MMHG | DIASTOLIC BLOOD PRESSURE: 70 MMHG

## 2025-08-20 DIAGNOSIS — I10 ESSENTIAL (PRIMARY) HYPERTENSION: ICD-10-CM

## 2025-08-20 DIAGNOSIS — Z86.59 PERSONAL HISTORY OF OTHER MENTAL AND BEHAVIORAL DISORDERS: ICD-10-CM

## 2025-08-20 PROCEDURE — 99214 OFFICE O/P EST MOD 30 MIN: CPT

## 2025-08-20 PROCEDURE — G2211 COMPLEX E/M VISIT ADD ON: CPT

## 2025-08-20 RX ORDER — ALPRAZOLAM 0.25 MG/1
0.25 TABLET ORAL 3 TIMES DAILY
Qty: 9 | Refills: 0 | Status: ACTIVE | COMMUNITY
Start: 2025-08-20 | End: 1900-01-01

## 2025-08-20 RX ORDER — OLMESARTAN MEDOXOMIL 20 MG/1
20 TABLET, FILM COATED ORAL DAILY
Qty: 45 | Refills: 1 | Status: ACTIVE | COMMUNITY
Start: 2025-08-20 | End: 1900-01-01